# Patient Record
Sex: FEMALE | Race: WHITE | Employment: OTHER | URBAN - METROPOLITAN AREA
[De-identification: names, ages, dates, MRNs, and addresses within clinical notes are randomized per-mention and may not be internally consistent; named-entity substitution may affect disease eponyms.]

---

## 2017-05-22 ENCOUNTER — HOSPITAL ENCOUNTER (OUTPATIENT)
Dept: MAMMOGRAPHY | Age: 67
Discharge: HOME | End: 2017-05-22
Attending: FAMILY MEDICINE
Payer: MEDICARE

## 2017-05-22 DIAGNOSIS — Z12.31 ENCOUNTER FOR SCREENING MAMMOGRAM FOR BREAST CANCER: ICD-10-CM

## 2017-05-22 PROCEDURE — 77067 SCR MAMMO BI INCL CAD: CPT

## 2017-05-25 ENCOUNTER — HOSPITAL ENCOUNTER (OUTPATIENT)
Dept: MAMMOGRAPHY | Age: 67
Discharge: HOME | End: 2017-05-25
Attending: FAMILY MEDICINE
Payer: MEDICARE

## 2017-05-25 DIAGNOSIS — R92.8 ABNORMAL SCREENING MAMMOGRAM: ICD-10-CM

## 2017-05-25 PROCEDURE — 76642 ULTRASOUND BREAST LIMITED: CPT

## 2017-05-25 PROCEDURE — 77065 DX MAMMO INCL CAD UNI: CPT

## 2017-05-31 ENCOUNTER — HOSPITAL ENCOUNTER (OUTPATIENT)
Dept: MAMMOGRAPHY | Age: 67
Discharge: HOME | End: 2017-05-31
Attending: FAMILY MEDICINE
Payer: MEDICARE

## 2017-05-31 VITALS — HEART RATE: 101 BPM | DIASTOLIC BLOOD PRESSURE: 60 MMHG | OXYGEN SATURATION: 97 % | SYSTOLIC BLOOD PRESSURE: 127 MMHG

## 2017-05-31 DIAGNOSIS — R92.1 BREAST CALCIFICATION, LEFT: ICD-10-CM

## 2017-05-31 PROCEDURE — 74011250636 HC RX REV CODE- 250/636: Performed by: FAMILY MEDICINE

## 2017-05-31 PROCEDURE — 74011000250 HC RX REV CODE- 250: Performed by: FAMILY MEDICINE

## 2017-05-31 PROCEDURE — 19081 BX BREAST 1ST LESION STRTCTC: CPT

## 2017-05-31 PROCEDURE — 77065 DX MAMMO INCL CAD UNI: CPT

## 2017-05-31 PROCEDURE — 88305 TISSUE EXAM BY PATHOLOGIST: CPT | Performed by: FAMILY MEDICINE

## 2017-05-31 RX ORDER — LIDOCAINE HYDROCHLORIDE AND EPINEPHRINE 10; 10 MG/ML; UG/ML
10 INJECTION, SOLUTION INFILTRATION; PERINEURAL
Status: COMPLETED | OUTPATIENT
Start: 2017-05-31 | End: 2017-05-31

## 2017-05-31 RX ORDER — LIDOCAINE HYDROCHLORIDE 10 MG/ML
10 INJECTION INFILTRATION; PERINEURAL
Status: COMPLETED | OUTPATIENT
Start: 2017-05-31 | End: 2017-05-31

## 2017-05-31 RX ADMIN — LIDOCAINE HYDROCHLORIDE 1 ML: 10 INJECTION, SOLUTION INFILTRATION; PERINEURAL at 10:08

## 2017-05-31 RX ADMIN — LIDOCAINE HYDROCHLORIDE,EPINEPHRINE BITARTRATE 30 MG: 10; .01 INJECTION, SOLUTION INFILTRATION; PERINEURAL at 10:09

## 2017-05-31 RX ADMIN — SODIUM CHLORIDE 250 ML: 900 INJECTION, SOLUTION INTRAVENOUS at 10:09

## 2018-04-19 ENCOUNTER — APPOINTMENT (OUTPATIENT)
Dept: GENERAL RADIOLOGY | Age: 68
End: 2018-04-19
Attending: EMERGENCY MEDICINE
Payer: MEDICARE

## 2018-04-19 ENCOUNTER — HOSPITAL ENCOUNTER (EMERGENCY)
Age: 68
Discharge: HOME | End: 2018-04-19
Attending: EMERGENCY MEDICINE
Payer: MEDICARE

## 2018-04-19 VITALS
HEART RATE: 92 BPM | WEIGHT: 200 LBS | RESPIRATION RATE: 18 BRPM | TEMPERATURE: 98.4 F | OXYGEN SATURATION: 99 % | SYSTOLIC BLOOD PRESSURE: 128 MMHG | BODY MASS INDEX: 31.39 KG/M2 | DIASTOLIC BLOOD PRESSURE: 70 MMHG | HEIGHT: 67 IN

## 2018-04-19 DIAGNOSIS — M25.512 LEFT SHOULDER PAIN, UNSPECIFIED CHRONICITY: Primary | ICD-10-CM

## 2018-04-19 LAB
ALBUMIN SERPL-MCNC: 4 G/DL (ref 3.2–4.6)
ALBUMIN/GLOB SERPL: 1 {RATIO} (ref 1.2–3.5)
ALP SERPL-CCNC: 116 U/L (ref 50–136)
ALT SERPL-CCNC: 28 U/L (ref 12–65)
ANION GAP SERPL CALC-SCNC: 10 MMOL/L (ref 7–16)
AST SERPL-CCNC: 19 U/L (ref 15–37)
ATRIAL RATE: 100 BPM
BASOPHILS # BLD: 0.1 K/UL (ref 0–0.2)
BASOPHILS NFR BLD: 1 % (ref 0–2)
BILIRUB SERPL-MCNC: 0.2 MG/DL (ref 0.2–1.1)
BUN SERPL-MCNC: 13 MG/DL (ref 8–23)
CALCIUM SERPL-MCNC: 9.4 MG/DL (ref 8.3–10.4)
CALCULATED P AXIS, ECG09: 76 DEGREES
CALCULATED R AXIS, ECG10: 34 DEGREES
CALCULATED T AXIS, ECG11: 41 DEGREES
CHLORIDE SERPL-SCNC: 106 MMOL/L (ref 98–107)
CO2 SERPL-SCNC: 24 MMOL/L (ref 21–32)
CREAT SERPL-MCNC: 1.01 MG/DL (ref 0.6–1)
DIAGNOSIS, 93000: NORMAL
DIFFERENTIAL METHOD BLD: ABNORMAL
EOSINOPHIL # BLD: 0.2 K/UL (ref 0–0.8)
EOSINOPHIL NFR BLD: 2 % (ref 0.5–7.8)
ERYTHROCYTE [DISTWIDTH] IN BLOOD BY AUTOMATED COUNT: 15.1 % (ref 11.9–14.6)
GLOBULIN SER CALC-MCNC: 4.2 G/DL (ref 2.3–3.5)
GLUCOSE SERPL-MCNC: 91 MG/DL (ref 65–100)
HCT VFR BLD AUTO: 40.6 % (ref 35.8–46.3)
HGB BLD-MCNC: 13.3 G/DL (ref 11.7–15.4)
IMM GRANULOCYTES # BLD: 0 K/UL (ref 0–0.5)
IMM GRANULOCYTES NFR BLD AUTO: 0 % (ref 0–5)
LYMPHOCYTES # BLD: 4.6 K/UL (ref 0.5–4.6)
LYMPHOCYTES NFR BLD: 45 % (ref 13–44)
MCH RBC QN AUTO: 28.6 PG (ref 26.1–32.9)
MCHC RBC AUTO-ENTMCNC: 32.8 G/DL (ref 31.4–35)
MCV RBC AUTO: 87.3 FL (ref 79.6–97.8)
MONOCYTES # BLD: 0.6 K/UL (ref 0.1–1.3)
MONOCYTES NFR BLD: 6 % (ref 4–12)
NEUTS SEG # BLD: 4.7 K/UL (ref 1.7–8.2)
NEUTS SEG NFR BLD: 46 % (ref 43–78)
P-R INTERVAL, ECG05: 164 MS
PLATELET # BLD AUTO: 334 K/UL (ref 150–450)
PMV BLD AUTO: 10.8 FL (ref 10.8–14.1)
POTASSIUM SERPL-SCNC: 4 MMOL/L (ref 3.5–5.1)
PROT SERPL-MCNC: 8.2 G/DL (ref 6.3–8.2)
Q-T INTERVAL, ECG07: 344 MS
QRS DURATION, ECG06: 68 MS
QTC CALCULATION (BEZET), ECG08: 443 MS
RBC # BLD AUTO: 4.65 M/UL (ref 4.05–5.25)
SODIUM SERPL-SCNC: 140 MMOL/L (ref 136–145)
TROPONIN I SERPL-MCNC: <0.02 NG/ML (ref 0.02–0.05)
TROPONIN I SERPL-MCNC: <0.02 NG/ML (ref 0.02–0.05)
VENTRICULAR RATE, ECG03: 100 BPM
WBC # BLD AUTO: 10.1 K/UL (ref 4.3–11.1)

## 2018-04-19 PROCEDURE — 85025 COMPLETE CBC W/AUTO DIFF WBC: CPT | Performed by: EMERGENCY MEDICINE

## 2018-04-19 PROCEDURE — 73030 X-RAY EXAM OF SHOULDER: CPT

## 2018-04-19 PROCEDURE — 99283 EMERGENCY DEPT VISIT LOW MDM: CPT | Performed by: EMERGENCY MEDICINE

## 2018-04-19 PROCEDURE — 80053 COMPREHEN METABOLIC PANEL: CPT | Performed by: EMERGENCY MEDICINE

## 2018-04-19 PROCEDURE — 84484 ASSAY OF TROPONIN QUANT: CPT | Performed by: EMERGENCY MEDICINE

## 2018-04-19 PROCEDURE — 93005 ELECTROCARDIOGRAM TRACING: CPT | Performed by: EMERGENCY MEDICINE

## 2018-04-19 NOTE — ED PROVIDER NOTES
HPI Comments: Patient is a 62-year-old female history of MI status post 13 stents, anxiety and fibromyalgia presenting with 1.5 months of left shoulder pain. She states she was recently started on Lyrica by her PCP for her fibromyalgia but weaned herself off this medication because it causes her to become depressed. She is denying any shortness of breath, chest pain, leg swelling, abdominal pain or fevers. States pain is primarily in the left trapezius muscle region and worse with movement. No erythema or warmth noted by palpation. Patient is a 74 y.o. female presenting with shoulder pain. The history is provided by the patient. No  was used. Shoulder Pain           Past Medical History:   Diagnosis Date    Angina pectoris ([address] stable, exertional, post infarction     Backache, unspecified 7/9/2013    Chest pain     Coronary atherosclerosis of native coronary artery 8/3/2015    Degeneration of intervertebral disc, site unspecified 7/9/2013    Essential hypertension, benign 8/3/2015    Family history of other musculoskeletal diseases(V17.89) 7/9/2013    History of MI (myocardial infarction)     Mixed hyperlipidemia 8/3/2015    Other forms of migraine, without mention of intractable migraine without mention of status migrainosus 7/9/2013    Palpitations     Spasm of muscle 8/3/2015    Syncope and collapse     Unspecified hypothyroidism 8/3/2015       Past Surgical History:   Procedure Laterality Date    CARDIAC SURG PROCEDURE UNLIST  2004    MI 3 stents    HX TONSIL AND ADENOIDECTOMY      HX TOTAL VAGINAL HYSTERECTOMY           Family History:   Problem Relation Age of Onset    Lung Disease Mother     Heart Disease Mother     Psychiatric Disorder Son      schizoaffective       Social History     Social History    Marital status:      Spouse name: N/A    Number of children: N/A    Years of education: N/A     Occupational History    Not on file.      Social History Main Topics    Smoking status: Never Smoker    Smokeless tobacco: Never Used    Alcohol use No      Comment: occ    Drug use: No    Sexual activity: No     Other Topics Concern     Service No    Blood Transfusions No    Caffeine Concern No    Occupational Exposure No    Hobby Hazards No    Sleep Concern No    Stress Concern No    Weight Concern No    Special Diet No    Back Care Yes    Exercise No    Bike Helmet No    Seat Belt Yes    Self-Exams No     Social History Narrative    Lives with daughter, son-in-law and grandchildren. ALLERGIES: Crestor [rosuvastatin]; Imdur [isosorbide mononitrate]; Iodinated contrast- oral and iv dye; Lipitor [atorvastatin]; Monosodium glutamate; Niaspan [niacin]; and Statins-hmg-coa reductase inhibitors    Review of Systems   Constitutional: Negative for fatigue and fever. HENT: Negative for sore throat. Respiratory: Negative for cough, chest tightness and shortness of breath. Cardiovascular: Negative for leg swelling. Gastrointestinal: Negative for abdominal pain. Genitourinary: Negative for dysuria. Musculoskeletal: Negative for back pain. Left shoulder pain   Neurological: Negative for syncope and headaches. Psychiatric/Behavioral: Negative for confusion. Vitals:    04/19/18 1655   BP: 126/74   Pulse: 96   Resp: 18   Temp: 98.2 °F (36.8 °C)   SpO2: 99%   Weight: 90.7 kg (200 lb)   Height: 5' 7\" (1.702 m)            Physical Exam   Constitutional: She is oriented to person, place, and time. She appears well-developed and well-nourished. No distress. HENT:   Head: Normocephalic and atraumatic. Eyes: Conjunctivae and EOM are normal. Pupils are equal, round, and reactive to light. Neck: Normal range of motion. Neck supple. Cardiovascular: Normal rate, regular rhythm and normal heart sounds. Pulmonary/Chest: Effort normal and breath sounds normal. No respiratory distress. She has no wheezes.  She has no rales.   Abdominal: Soft. She exhibits no distension. There is no tenderness. There is no rebound. Musculoskeletal: Normal range of motion. She exhibits tenderness. She exhibits no edema. Pain to palpation in the left before meals joint region as well as left trapezius region. Somewhat decreased range of motion secondary to pain. Normal pulses distally. Neurological: She is alert and oriented to person, place, and time. Skin: Skin is warm and dry. No rash noted. She is not diaphoretic. Psychiatric: She has a normal mood and affect. Her behavior is normal.   Vitals reviewed. MDM  Number of Diagnoses or Management Options  Left shoulder pain, unspecified chronicity: new and does not require workup  Diagnosis management comments: EKG reassuring. Troponin ×2 negative. I believe this is musculoskeletal shoulder pain likely related to the before meals pathology indicated on x-ray. Patient has pain medications at home. Discussed follow up with PCP. Discharged in stable condition. Return precautions discussed. Melissa Bryant MD; 4/19/2018 @8:26 PM Voice dictation software was used during the making of this note. This software is not perfect and grammatical and other typographical errors may be present.   This note has not been proofread for errors.  ===================================================================         Amount and/or Complexity of Data Reviewed  Clinical lab tests: reviewed and ordered (Results for orders placed or performed during the hospital encounter of 04/19/18  -CBC WITH AUTOMATED DIFF       Result                                            Value                         Ref Range                       WBC                                               10.1                          4.3 - 11.1 K/uL                 RBC                                               4.65                          4.05 - 5.25 M/uL                HGB 13.3                          11.7 - 15.4 g/dL                HCT                                               40.6                          35.8 - 46.3 %                   MCV                                               87.3                          79.6 - 97.8 FL                  MCH                                               28.6                          26.1 - 32.9 PG                  MCHC                                              32.8                          31.4 - 35.0 g/dL                RDW                                               15.1 (H)                      11.9 - 14.6 %                   PLATELET                                          334                           150 - 450 K/uL                  MPV                                               10.8                          10.8 - 14.1 FL                  DF                                                AUTOMATED                                                     NEUTROPHILS                                       46                            43 - 78 %                       LYMPHOCYTES                                       45 (H)                        13 - 44 %                       MONOCYTES                                         6                             4.0 - 12.0 %                    EOSINOPHILS                                       2                             0.5 - 7.8 %                     BASOPHILS                                         1                             0.0 - 2.0 %                     IMMATURE GRANULOCYTES                             0                             0.0 - 5.0 %                     ABS. NEUTROPHILS                                  4.7                           1.7 - 8.2 K/UL                  ABS.  LYMPHOCYTES                                  4.6                           0.5 - 4.6 K/UL                  ABS. MONOCYTES                                    0.6                           0.1 - 1.3 K/UL                  ABS. EOSINOPHILS                                  0.2                           0.0 - 0.8 K/UL                  ABS. BASOPHILS                                    0.1                           0.0 - 0.2 K/UL                  ABS. IMM.  GRANS.                                  0.0                           0.0 - 0.5 K/UL             -METABOLIC PANEL, COMPREHENSIVE       Result                                            Value                         Ref Range                       Sodium                                            140                           136 - 145 mmol/L                Potassium                                         4.0                           3.5 - 5.1 mmol/L                Chloride                                          106                           98 - 107 mmol/L                 CO2                                               24                            21 - 32 mmol/L                  Anion gap                                         10                            7 - 16 mmol/L                   Glucose                                           91                            65 - 100 mg/dL                  BUN                                               13                            8 - 23 MG/DL                    Creatinine                                        1.01 (H)                      0.6 - 1.0 MG/DL                 GFR est AA                                        >60                           >60 ml/min/1.73m2               GFR est non-AA                                    58 (L)                        >60 ml/min/1.73m2               Calcium                                           9.4                           8.3 - 10.4 MG/DL                Bilirubin, total                                  0.2                           0.2 - 1.1 MG/DL                 ALT (SGPT)                                        28                            12 - 65 U/L AST (SGOT)                                        19                            15 - 37 U/L                     Alk. phosphatase                                  116                           50 - 136 U/L                    Protein, total                                    8.2                           6.3 - 8.2 g/dL                  Albumin                                           4.0                           3.2 - 4.6 g/dL                  Globulin                                          4.2 (H)                       2.3 - 3.5 g/dL                  A-G Ratio                                         1.0 (L)                       1.2 - 3.5                  -TROPONIN I       Result                                            Value                         Ref Range                       Troponin-I, Qt.                                   <0.02 (L)                     0.02 - 0.05 NG/ML          -TROPONIN I       Result                                            Value                         Ref Range                       Troponin-I, Qt.                                   <0.02 (L)                     0.02 - 0.05 NG/ML          -EKG, 12 LEAD, INITIAL       Result                                            Value                         Ref Range                       Ventricular Rate                                  100                           BPM                             Atrial Rate                                       100                           BPM                             P-R Interval                                      164                           ms                              QRS Duration                                      68                            ms                              Q-T Interval                                      344                           ms                              QTC Calculation (Bezet)                           443                           ms Calculated P Axis                                 76                            degrees                         Calculated R Axis                                 34                            degrees                         Calculated T Axis                                 41                            degrees                         Diagnosis                                                                                                   Sinus rhythm with occasional Premature ventricular complexes   Low voltage QRS   Cannot rule out Anterior infarct , age undetermined   Abnormal ECG   No previous ECGs available     )  Tests in the radiology section of CPT®: ordered and reviewed (Xr Shoulder Lt Ap/lat Min 2 V    Result Date: 4/19/2018  LEFT SHOULDER SERIES HISTORY: Left shoulder pain FINDINGS: Acromioclavicular joint arthrosis is present. There is no displaced fracture or dislocation. Included portion of the left lung is clear.      IMPRESSION: AC joint arthrosis.    )  Review and summarize past medical records: yes  Independent visualization of images, tracings, or specimens: yes    Risk of Complications, Morbidity, and/or Mortality  Presenting problems: moderate  Diagnostic procedures: moderate  Management options: moderate    Patient Progress  Patient progress: stable        ED Course       Procedures

## 2018-04-20 NOTE — DISCHARGE INSTRUCTIONS
Musculoskeletal Pain: Care Instructions  Your Care Instructions  Different problems with the bones, muscles, nerves, ligaments, and tendons in the body can cause pain. One or more areas of your body may ache or burn. Or they may feel tired, stiff, or sore. The medical term for this type of pain is musculoskeletal pain. It can have many different causes. Sometimes the pain is caused by an injury such as a strain or sprain. Or you might have pain from using one part of your body in the same way over and over again. This is called overuse. In some cases, the cause of the pain is another health problem such as arthritis or fibromyalgia. The doctor will examine you and ask you questions about your health to help find the cause of your pain. Blood tests or imaging tests like an X-ray may also be helpful. But sometimes doctors can't find a cause of the pain. Treatment depends on your symptoms and the cause of the pain, if known. The doctor has checked you carefully, but problems can develop later. If you notice any problems or new symptoms, get medical treatment right away. Follow-up care is a key part of your treatment and safety. Be sure to make and go to all appointments, and call your doctor if you are having problems. It's also a good idea to know your test results and keep a list of the medicines you take. How can you care for yourself at home? · Rest until you feel better. · Do not do anything that makes the pain worse. Return to exercise gradually if you feel better and your doctor says it's okay. · Be safe with medicines. Read and follow all instructions on the label. ¨ If the doctor gave you a prescription medicine for pain, take it as prescribed. ¨ If you are not taking a prescription pain medicine, ask your doctor if you can take an over-the-counter medicine. · Put ice or a cold pack on the area for 10 to 20 minutes at a time to ease pain. Put a thin cloth between the ice and your skin.   When should you call for help? Call your doctor now or seek immediate medical care if:  · You have new pain, or your pain gets worse. · You have new symptoms such as a fever, a rash, or chills. Watch closely for changes in your health, and be sure to contact your doctor if:  · You do not get better as expected. Where can you learn more? Go to [employer]be  Enter Q624 in the search box to learn more about \"Musculoskeletal Pain: Care Instructions. \"   © 8728-1483 [employer]. Care instructions adapted under license by Leighann Bell (which disclaims liability or warranty for this information). This care instruction is for use with your licensed healthcare professional. If you have questions about a medical condition or this instruction, always ask your healthcare professional. [address] any warranty or liability for your use of this information.   Content Version: 71.6.175158; Current as of: November 20, 2015

## 2018-04-20 NOTE — ED NOTES
I have reviewed discharge instructions with the patient. The patient verbalized understanding. Patient left ED via Discharge Method: ambulatory to Home with daughter. Opportunity for questions and clarification provided. Patient given 0 scripts. To continue your aftercare when you leave the hospital, you may receive an automated call from our care team to check in on how you are doing. This is a free service and part of our promise to provide the best care and service to meet your aftercare needs.  If you have questions, or wish to unsubscribe from this service please call [phone]. Thank you for Choosing our [facility] Emergency Department.

## 2018-05-17 ENCOUNTER — HOSPITAL ENCOUNTER (OUTPATIENT)
Dept: MAMMOGRAPHY | Age: 68
Discharge: HOME | End: 2018-05-17
Attending: FAMILY MEDICINE
Payer: MEDICARE

## 2018-05-17 DIAGNOSIS — Z13.820 SCREENING FOR OSTEOPOROSIS: ICD-10-CM

## 2018-05-17 PROCEDURE — 77080 DXA BONE DENSITY AXIAL: CPT

## 2019-01-06 ENCOUNTER — APPOINTMENT (OUTPATIENT)
Dept: GENERAL RADIOLOGY | Age: 69
End: 2019-01-06
Attending: EMERGENCY MEDICINE
Payer: MEDICARE

## 2019-01-06 ENCOUNTER — HOSPITAL ENCOUNTER (EMERGENCY)
Age: 69
Discharge: HOME | End: 2019-01-06
Attending: EMERGENCY MEDICINE
Payer: MEDICARE

## 2019-01-06 VITALS
RESPIRATION RATE: 20 BRPM | HEART RATE: 77 BPM | OXYGEN SATURATION: 96 % | TEMPERATURE: 98.7 F | HEIGHT: 67 IN | BODY MASS INDEX: 31.39 KG/M2 | DIASTOLIC BLOOD PRESSURE: 70 MMHG | SYSTOLIC BLOOD PRESSURE: 127 MMHG | WEIGHT: 200 LBS

## 2019-01-06 DIAGNOSIS — R07.9 CHEST PAIN OF UNCERTAIN ETIOLOGY: Primary | ICD-10-CM

## 2019-01-06 LAB
ALBUMIN SERPL-MCNC: 3.7 G/DL (ref 3.2–4.6)
ALBUMIN/GLOB SERPL: 0.9 {RATIO}
ALP SERPL-CCNC: 128 U/L (ref 50–136)
ALT SERPL-CCNC: 19 U/L (ref 12–65)
ANION GAP SERPL CALC-SCNC: 7 MMOL/L
AST SERPL-CCNC: 11 U/L (ref 15–37)
ATRIAL RATE: 96 BPM
BASOPHILS # BLD: 0.1 K/UL (ref 0–0.2)
BASOPHILS NFR BLD: 1 % (ref 0–2)
BILIRUB SERPL-MCNC: 0.2 MG/DL (ref 0.2–1.1)
BUN SERPL-MCNC: 16 MG/DL (ref 8–23)
CALCIUM SERPL-MCNC: 9.3 MG/DL (ref 8.3–10.4)
CALCULATED P AXIS, ECG09: 75 DEGREES
CALCULATED R AXIS, ECG10: 32 DEGREES
CALCULATED T AXIS, ECG11: 33 DEGREES
CHLORIDE SERPL-SCNC: 103 MMOL/L (ref 98–107)
CO2 SERPL-SCNC: 27 MMOL/L (ref 21–32)
CREAT SERPL-MCNC: 1.05 MG/DL (ref 0.6–1)
DIAGNOSIS, 93000: NORMAL
DIFFERENTIAL METHOD BLD: ABNORMAL
EOSINOPHIL # BLD: 0.2 K/UL (ref 0–0.8)
EOSINOPHIL NFR BLD: 2 % (ref 0.5–7.8)
ERYTHROCYTE [DISTWIDTH] IN BLOOD BY AUTOMATED COUNT: 14.3 % (ref 11.9–14.6)
GLOBULIN SER CALC-MCNC: 4.2 G/DL (ref 2.3–3.5)
GLUCOSE SERPL-MCNC: 109 MG/DL (ref 65–100)
HCT VFR BLD AUTO: 42.2 % (ref 35.8–46.3)
HGB BLD-MCNC: 13.1 G/DL (ref 11.7–15.4)
IMM GRANULOCYTES # BLD: 0 K/UL (ref 0–0.5)
IMM GRANULOCYTES NFR BLD AUTO: 0 % (ref 0–5)
LIPASE SERPL-CCNC: 172 U/L (ref 73–393)
LYMPHOCYTES # BLD: 2.9 K/UL (ref 0.5–4.6)
LYMPHOCYTES NFR BLD: 36 % (ref 13–44)
MCH RBC QN AUTO: 28.3 PG (ref 26.1–32.9)
MCHC RBC AUTO-ENTMCNC: 31 G/DL (ref 31.4–35)
MCV RBC AUTO: 91.1 FL (ref 79.6–97.8)
MONOCYTES # BLD: 0.5 K/UL (ref 0.1–1.3)
MONOCYTES NFR BLD: 7 % (ref 4–12)
NEUTS SEG # BLD: 4.4 K/UL (ref 1.7–8.2)
NEUTS SEG NFR BLD: 54 % (ref 43–78)
NRBC # BLD: 0 K/UL (ref 0–0.2)
P-R INTERVAL, ECG05: 176 MS
PLATELET # BLD AUTO: 330 K/UL (ref 150–450)
PMV BLD AUTO: 10.5 FL (ref 9.4–12.3)
POTASSIUM SERPL-SCNC: 4.1 MMOL/L (ref 3.5–5.1)
PROT SERPL-MCNC: 7.9 G/DL
Q-T INTERVAL, ECG07: 338 MS
QRS DURATION, ECG06: 72 MS
QTC CALCULATION (BEZET), ECG08: 427 MS
RBC # BLD AUTO: 4.63 M/UL (ref 4.05–5.2)
SODIUM SERPL-SCNC: 137 MMOL/L (ref 136–145)
TROPONIN I BLD-MCNC: 0 NG/ML (ref 0.02–0.05)
TROPONIN I BLD-MCNC: 0.01 NG/ML (ref 0.02–0.05)
VENTRICULAR RATE, ECG03: 96 BPM
WBC # BLD AUTO: 8.1 K/UL (ref 4.3–11.1)

## 2019-01-06 PROCEDURE — 93005 ELECTROCARDIOGRAM TRACING: CPT | Performed by: EMERGENCY MEDICINE

## 2019-01-06 PROCEDURE — 74011250637 HC RX REV CODE- 250/637: Performed by: EMERGENCY MEDICINE

## 2019-01-06 PROCEDURE — 99285 EMERGENCY DEPT VISIT HI MDM: CPT | Performed by: EMERGENCY MEDICINE

## 2019-01-06 PROCEDURE — 80053 COMPREHEN METABOLIC PANEL: CPT

## 2019-01-06 PROCEDURE — 84484 ASSAY OF TROPONIN QUANT: CPT

## 2019-01-06 PROCEDURE — 85025 COMPLETE CBC W/AUTO DIFF WBC: CPT

## 2019-01-06 PROCEDURE — 74011000250 HC RX REV CODE- 250: Performed by: EMERGENCY MEDICINE

## 2019-01-06 PROCEDURE — 96374 THER/PROPH/DIAG INJ IV PUSH: CPT | Performed by: EMERGENCY MEDICINE

## 2019-01-06 PROCEDURE — 71046 X-RAY EXAM CHEST 2 VIEWS: CPT

## 2019-01-06 PROCEDURE — 74011250636 HC RX REV CODE- 250/636: Performed by: EMERGENCY MEDICINE

## 2019-01-06 PROCEDURE — 83690 ASSAY OF LIPASE: CPT

## 2019-01-06 RX ORDER — GUAIFENESIN 100 MG/5ML
324 LIQUID (ML) ORAL
Status: COMPLETED | OUTPATIENT
Start: 2019-01-06 | End: 2019-01-06

## 2019-01-06 RX ORDER — MAG HYDROX/ALUMINUM HYD/SIMETH 200-200-20
30 SUSPENSION, ORAL (FINAL DOSE FORM) ORAL
Status: COMPLETED | OUTPATIENT
Start: 2019-01-06 | End: 2019-01-06

## 2019-01-06 RX ORDER — ONDANSETRON 2 MG/ML
4 INJECTION INTRAMUSCULAR; INTRAVENOUS
Status: COMPLETED | OUTPATIENT
Start: 2019-01-06 | End: 2019-01-06

## 2019-01-06 RX ORDER — LIDOCAINE HYDROCHLORIDE 20 MG/ML
15 SOLUTION OROPHARYNGEAL
Status: COMPLETED | OUTPATIENT
Start: 2019-01-06 | End: 2019-01-06

## 2019-01-06 RX ADMIN — LIDOCAINE HYDROCHLORIDE 15 ML: 20 SOLUTION ORAL; TOPICAL at 14:18

## 2019-01-06 RX ADMIN — ONDANSETRON 4 MG: 2 INJECTION INTRAMUSCULAR; INTRAVENOUS at 14:35

## 2019-01-06 RX ADMIN — ASPIRIN 81 MG 324 MG: 81 TABLET ORAL at 13:57

## 2019-01-06 RX ADMIN — ALUMINUM HYDROXIDE, MAGNESIUM HYDROXIDE, AND SIMETHICONE 30 ML: 200; 200; 20 SUSPENSION ORAL at 14:18

## 2019-01-06 NOTE — ED TRIAGE NOTES
Patient arrives from home with complaint of mid-right, \"dull, heavy\" chest pain that started 12:15 this afternoon. Patient was resting in bed when this started. The pain is not reproducible and non-radiating. Patient took four tums without relief. Patient then took three doses of sublingual Nitro tabs, which dulled the pain but did not eliminate it. Denies nausea, vomiting, sweating, dizziness. Patient has had a heart attack in 2004 with 13 stents placed since then. The last stent placed in 2010

## 2019-01-06 NOTE — ED NOTES
I have reviewed discharge instructions with the patient. The patient verbalized understanding. Patient left ED via Discharge Method: ambulatory to Home with her daughter at bedside. Opportunity for questions and clarification provided. Patient given 0 scripts. To continue your aftercare when you leave the hospital, you may receive an automated call from our care team to check in on how you are doing. This is a free service and part of our promise to provide the best care and service to meet your aftercare needs.  If you have questions, or wish to unsubscribe from this service please call [phone]. Thank you for Choosing our [facility] Emergency Department.

## 2019-01-06 NOTE — ED PROVIDER NOTES
HPI  
 
19-year-old female presenting to the emergency department for evaluation of chest pain. Her symptoms began at 12:15. It's located sternally and slightly to the right. She states it feels like something is stuck there. It did occur approximately 30-40 minutes after she took her morning medications. However she does not feel like something is hung up in her chest.  Her that she is having difficulty swallowing. She does have a known history of coronary artery disease. She reports a history of myocardial infarction and states she has 13 stents. She states her last stent was in 2010 when she had her last catheterization. Her last stress testing was done at least 2-3 years ago. She states that the pain does not radiate. It is not associated with shortness of breath or diaphoresis. There are no alleviating or exacerbating symptoms. She took Tums after the pain started which did not help. She then took nitroglycerin which did not help either though she does state it slightly dulled the pain. She has no history of similar symptoms to this in the past. 
 
Past Medical History:  
Diagnosis Date  Angina pectoris ([address] stable, exertional, post infarction  Backache, unspecified 7/9/2013  Chest pain  Coronary atherosclerosis of native coronary artery 8/3/2015  Degeneration of intervertebral disc, site unspecified 7/9/2013  Essential hypertension, benign 8/3/2015  Family history of other musculoskeletal diseases(V17.89) 7/9/2013  History of MI (myocardial infarction)  Mixed hyperlipidemia 8/3/2015  Other forms of migraine, without mention of intractable migraine without mention of status migrainosus 7/9/2013  Palpitations  Spasm of muscle 8/3/2015  Syncope and collapse  Unspecified hypothyroidism 8/3/2015 Past Surgical History:  
Procedure Laterality Date  CARDIAC SURG PROCEDURE UNLIST  2004 MI 3 stents  HX TONSIL AND ADENOIDECTOMY  HX TOTAL VAGINAL HYSTERECTOMY Family History:  
Problem Relation Age of Onset  Lung Disease Mother  Heart Disease Mother  Psychiatric Disorder Son   
     schizoaffective Social History Socioeconomic History  Marital status:  Spouse name: Not on file  Number of children: Not on file  Years of education: Not on file  Highest education level: Not on file Social Needs  Financial resource strain: Not on file  Food insecurity - worry: Not on file  Food insecurity - inability: Not on file  Transportation needs - medical: Not on file  Transportation needs - non-medical: Not on file Occupational History  Not on file Tobacco Use  Smoking status: Never Smoker  Smokeless tobacco: Never Used Substance and Sexual Activity  Alcohol use: No  
  Alcohol/week: 0.0 oz  
  Comment: occ  Drug use: No  
 Sexual activity: No  
Other Topics Concern   Service No  
 Blood Transfusions No  
 Caffeine Concern No  
 Occupational Exposure No  
 Hobby Hazards No  
 Sleep Concern No  
 Stress Concern No  
 Weight Concern No  
 Special Diet No  
 Back Care Yes  Exercise No  
 Bike Helmet No  
 Seat Belt Yes  Self-Exams No  
Social History Narrative Lives with daughter, son-in-law and grandchildren. ALLERGIES: Crestor [rosuvastatin]; Imdur [isosorbide mononitrate]; Iodinated contrast- oral and iv dye; Lipitor [atorvastatin]; Monosodium glutamate; Niaspan [niacin]; and Statins-hmg-coa reductase inhibitors Review of Systems Constitutional: Negative for fever. HENT: Negative. Eyes: Negative. Respiratory: Negative for cough, chest tightness, shortness of breath and wheezing. Cardiovascular: Positive for chest pain. Gastrointestinal: Negative for abdominal distention, abdominal pain, constipation, diarrhea and vomiting. Endocrine: Negative. Genitourinary: Negative for dysuria, flank pain, frequency and urgency. Neurological: Negative for dizziness, syncope and headaches. Psychiatric/Behavioral: Negative. All other systems reviewed and are negative. Vitals:  
 01/06/19 1335 01/06/19 1343 BP: 128/63 Pulse: 87 Resp: 18 Temp: 98.2 °F (36.8 °C) SpO2: 97% 97% Weight: 90.7 kg (200 lb) Height: 5' 7\" (1.702 m) Physical Exam  
Constitutional: She is oriented to person, place, and time. She appears well-developed and well-nourished. No distress. HENT:  
Head: Normocephalic and atraumatic. Eyes: EOM are normal. Pupils are equal, round, and reactive to light. Neck: Normal range of motion. Neck supple. Cardiovascular: Normal rate, regular rhythm, normal heart sounds and intact distal pulses. Exam reveals no gallop and no friction rub. No murmur heard. Pulmonary/Chest: Effort normal and breath sounds normal. No stridor. No respiratory distress. She has no wheezes. She exhibits no tenderness. Abdominal: Soft. Bowel sounds are normal. She exhibits no distension and no mass. There is no tenderness. There is no rebound and no guarding. Musculoskeletal: Normal range of motion. She exhibits no edema, tenderness or deformity. Neurological: She is alert and oriented to person, place, and time. Skin: Skin is warm and dry. Capillary refill takes less than 2 seconds. No rash noted. She is not diaphoretic. No erythema. Psychiatric: She has a normal mood and affect. Her behavior is normal.  
Vitals reviewed. MDM Number of Diagnoses or Management Options Chest pain of uncertain etiology:  
Diagnosis management comments: Differential diagnosis: ACS, PE, pneumonia, esophageal tear, esophageal perform body, dissection, reflux, peptic ulcer disease, gastritis 5:14 PM 
Patient presents to the ED today complaining of chest pain. Has a nonischemic EKG, negative troponin, neg three hour trop, unremarkable CXR. Given these findings, I think this is very unlikely to represent ACS or other concerning cause of chest pain. I did discuss the case with Dr. Niki Santos of [facility] cardiology who agrees that if the patient has 2 negative troponins she would be appropriate for outpatient follow-up. Her chest pain does sound extremely atypical and more likely to be gastrointestinal related to some sort of esophageal issue. However given her history, I will arrange for very close outpatient follow-up with cardiology. She understands that the exact cause of her symptoms is unclear at this time and because of this she should've a low threshold to return should her symptoms worsen in any way. Amount and/or Complexity of Data Reviewed Clinical lab tests: ordered and reviewed Tests in the radiology section of CPT®: ordered and reviewed Risk of Complications, Morbidity, and/or Mortality Presenting problems: high Diagnostic procedures: high Management options: high EKG Date/Time: 1/6/2019 1:49 PM 
Performed by: Hans Dunham MD 
Authorized by: Hans Dunham MD  
 
Interpretation: Interpretation: non-specific Rate:  
  ECG rate assessment: normal   
Rhythm:  
  Rhythm: sinus rhythm QRS:  
  QRS axis:  Normal 
ST segments: ST segments:  Non-specific T waves:  
  T waves: normal   
Comments:  
   No acute ischemic changes

## 2019-01-06 NOTE — DISCHARGE INSTRUCTIONS
As we discussed in the emergency department, the exact cause of her symptoms is unclear. Because of this she should have a low threshold to return should her symptoms change or worsen in any way. Otherwise, it is very important that he follow-up in 1-2 days with your cardiologist for further evaluation to ensure you are improving.

## 2019-01-23 ENCOUNTER — HOSPITAL ENCOUNTER (OUTPATIENT)
Dept: LAB | Age: 69
Discharge: HOME | End: 2019-01-23
Payer: MEDICARE

## 2019-01-23 DIAGNOSIS — E78.2 MIXED HYPERLIPIDEMIA: ICD-10-CM

## 2019-01-23 LAB
CHOLEST SERPL-MCNC: 185 MG/DL
HDLC SERPL-MCNC: 51 MG/DL (ref 40–60)
HDLC SERPL: 3.6 {RATIO}
LDLC SERPL CALC-MCNC: 89 MG/DL
LIPID PROFILE,FLP: ABNORMAL
TRIGL SERPL-MCNC: 225 MG/DL (ref 35–150)
VLDLC SERPL CALC-MCNC: 45 MG/DL (ref 6–23)

## 2019-01-23 PROCEDURE — 36415 COLL VENOUS BLD VENIPUNCTURE: CPT

## 2019-01-23 PROCEDURE — 80061 LIPID PANEL: CPT

## 2019-01-31 PROBLEM — E78.00 HYPERCHOLESTEREMIA: Status: ACTIVE | Noted: 2019-01-31

## 2020-09-04 PROBLEM — I25.9 CHRONIC ISCHEMIC HEART DISEASE: Status: ACTIVE | Noted: 2020-09-04

## 2020-09-11 ENCOUNTER — HOSPITAL ENCOUNTER (OUTPATIENT)
Dept: LAB | Age: 70
Discharge: HOME | End: 2020-09-11
Payer: MEDICARE

## 2020-09-11 DIAGNOSIS — E78.5 HYPERLIPIDEMIA, UNSPECIFIED HYPERLIPIDEMIA TYPE: ICD-10-CM

## 2020-09-11 LAB
CHOLEST SERPL-MCNC: 251 MG/DL
HDLC SERPL-MCNC: 40 MG/DL (ref 40–60)
HDLC SERPL: 6.3 {RATIO}
LDLC SERPL CALC-MCNC: 157.2 MG/DL
LIPID PROFILE,FLP: ABNORMAL
TRIGL SERPL-MCNC: 269 MG/DL (ref 35–150)
VLDLC SERPL CALC-MCNC: 53.8 MG/DL (ref 6–23)

## 2020-09-11 PROCEDURE — 36415 COLL VENOUS BLD VENIPUNCTURE: CPT

## 2020-09-11 PROCEDURE — 80061 LIPID PANEL: CPT

## 2021-01-06 PROBLEM — R47.01 EXPRESSIVE APHASIA: Status: ACTIVE | Noted: 2021-01-06

## 2021-01-06 PROBLEM — I10 HYPERTENSION: Status: ACTIVE | Noted: 2021-01-06

## 2022-03-19 PROBLEM — I25.9 CHRONIC ISCHEMIC HEART DISEASE: Status: ACTIVE | Noted: 2020-09-04

## 2022-03-19 PROBLEM — I10 HYPERTENSION: Status: ACTIVE | Noted: 2021-01-06

## 2022-03-20 PROBLEM — R47.01 EXPRESSIVE APHASIA: Status: ACTIVE | Noted: 2021-01-06

## 2022-03-20 PROBLEM — E78.00 HYPERCHOLESTEREMIA: Status: ACTIVE | Noted: 2019-01-31

## 2022-10-28 ENCOUNTER — TELEPHONE (OUTPATIENT)
Dept: CARDIOLOGY CLINIC | Age: 72
End: 2022-10-28

## 2022-10-28 RX ORDER — CLOPIDOGREL BISULFATE 75 MG/1
75 TABLET ORAL DAILY
Qty: 30 TABLET | Refills: 0 | Status: ON HOLD | OUTPATIENT
Start: 2022-10-28 | End: 2022-11-01 | Stop reason: SDUPTHER

## 2022-10-28 NOTE — TELEPHONE ENCOUNTER
MD Quiana Raza, RN  Caller: Unspecified (Today, 12:42 PM)  Will refill for now. Please refill. Refill escribed to preferred pharmacy.

## 2022-10-28 NOTE — TELEPHONE ENCOUNTER
MEDICATION REFILL REQUEST      Name of Medication:  Clopidogrel  Dose:  75 mg  Frequency:  1 a day  Quantity:  ?  Days' supply:  ?       Pharmacy Name/Location:  [facility] in [id number]    Pt is out please call in today

## 2022-10-30 ENCOUNTER — HOSPITAL ENCOUNTER (OUTPATIENT)
Age: 72
Setting detail: OBSERVATION
Discharge: HOME | End: 2022-11-01
Attending: INTERNAL MEDICINE | Admitting: INTERNAL MEDICINE
Payer: MEDICARE

## 2022-10-30 DIAGNOSIS — I25.10 CAD IN NATIVE ARTERY: Chronic | ICD-10-CM

## 2022-10-30 DIAGNOSIS — I25.9 CHRONIC ISCHEMIC HEART DISEASE: Primary | ICD-10-CM

## 2022-10-30 DIAGNOSIS — R07.9 CHEST PAIN: ICD-10-CM

## 2022-10-30 DIAGNOSIS — I20.8 OTHER FORMS OF ANGINA PECTORIS (HCC): ICD-10-CM

## 2022-10-30 DIAGNOSIS — R00.1 BRADYCARDIA: ICD-10-CM

## 2022-10-30 PROBLEM — M54.50 CHRONIC LOW BACK PAIN: Chronic | Status: ACTIVE | Noted: 2022-10-30

## 2022-10-30 PROBLEM — N30.01 ACUTE CYSTITIS WITH HEMATURIA: Status: ACTIVE | Noted: 2022-10-30

## 2022-10-30 PROBLEM — F41.9 ANXIETY: Chronic | Status: ACTIVE | Noted: 2022-10-30

## 2022-10-30 PROBLEM — G89.29 CHRONIC LOW BACK PAIN: Chronic | Status: ACTIVE | Noted: 2022-10-30

## 2022-10-30 LAB
EKG ATRIAL RATE: 77 BPM
EKG DIAGNOSIS: NORMAL
EKG P AXIS: 53 DEGREES
EKG P-R INTERVAL: 186 MS
EKG Q-T INTERVAL: 386 MS
EKG QRS DURATION: 82 MS
EKG QTC CALCULATION (BAZETT): 436 MS
EKG R AXIS: 15 DEGREES
EKG T AXIS: 71 DEGREES
EKG VENTRICULAR RATE: 77 BPM
TROPONIN I SERPL HS-MCNC: 4.9 PG/ML (ref 0–14)

## 2022-10-30 PROCEDURE — 93005 ELECTROCARDIOGRAM TRACING: CPT | Performed by: NURSE PRACTITIONER

## 2022-10-30 PROCEDURE — G0378 HOSPITAL OBSERVATION PER HR: HCPCS

## 2022-10-30 PROCEDURE — 36415 COLL VENOUS BLD VENIPUNCTURE: CPT

## 2022-10-30 PROCEDURE — 6370000000 HC RX 637 (ALT 250 FOR IP): Performed by: NURSE PRACTITIONER

## 2022-10-30 PROCEDURE — 84484 ASSAY OF TROPONIN QUANT: CPT

## 2022-10-30 PROCEDURE — 2580000003 HC RX 258: Performed by: NURSE PRACTITIONER

## 2022-10-30 RX ORDER — CLOPIDOGREL BISULFATE 75 MG/1
75 TABLET ORAL DAILY
Status: DISCONTINUED | OUTPATIENT
Start: 2022-10-30 | End: 2022-11-01 | Stop reason: HOSPADM

## 2022-10-30 RX ORDER — CLOPIDOGREL BISULFATE 75 MG/1
75 TABLET ORAL DAILY
Status: DISCONTINUED | OUTPATIENT
Start: 2022-10-31 | End: 2022-10-30

## 2022-10-30 RX ORDER — LEVOTHYROXINE SODIUM 112 UG/1
112 TABLET ORAL
Status: DISCONTINUED | OUTPATIENT
Start: 2022-10-31 | End: 2022-11-01 | Stop reason: HOSPADM

## 2022-10-30 RX ORDER — ONDANSETRON 2 MG/ML
4 INJECTION INTRAMUSCULAR; INTRAVENOUS EVERY 6 HOURS PRN
Status: DISCONTINUED | OUTPATIENT
Start: 2022-10-30 | End: 2022-11-01 | Stop reason: HOSPADM

## 2022-10-30 RX ORDER — MAGNESIUM HYDROXIDE/ALUMINUM HYDROXICE/SIMETHICONE 120; 1200; 1200 MG/30ML; MG/30ML; MG/30ML
30 SUSPENSION ORAL EVERY 6 HOURS PRN
Status: DISCONTINUED | OUTPATIENT
Start: 2022-10-30 | End: 2022-11-01 | Stop reason: HOSPADM

## 2022-10-30 RX ORDER — SODIUM CHLORIDE 9 MG/ML
INJECTION, SOLUTION INTRAVENOUS PRN
Status: DISCONTINUED | OUTPATIENT
Start: 2022-10-30 | End: 2022-11-01 | Stop reason: HOSPADM

## 2022-10-30 RX ORDER — POTASSIUM CHLORIDE 7.45 MG/ML
10 INJECTION INTRAVENOUS PRN
Status: DISCONTINUED | OUTPATIENT
Start: 2022-10-30 | End: 2022-11-01 | Stop reason: HOSPADM

## 2022-10-30 RX ORDER — ONDANSETRON 4 MG/1
4 TABLET, ORALLY DISINTEGRATING ORAL EVERY 8 HOURS PRN
Status: DISCONTINUED | OUTPATIENT
Start: 2022-10-30 | End: 2022-11-01 | Stop reason: HOSPADM

## 2022-10-30 RX ORDER — CYCLOBENZAPRINE HCL 10 MG
10 TABLET ORAL 3 TIMES DAILY PRN
Status: DISCONTINUED | OUTPATIENT
Start: 2022-10-30 | End: 2022-11-01 | Stop reason: HOSPADM

## 2022-10-30 RX ORDER — ACETAMINOPHEN 325 MG/1
650 TABLET ORAL EVERY 6 HOURS PRN
Status: DISCONTINUED | OUTPATIENT
Start: 2022-10-30 | End: 2022-10-31

## 2022-10-30 RX ORDER — SODIUM CHLORIDE 0.9 % (FLUSH) 0.9 %
5-40 SYRINGE (ML) INJECTION PRN
Status: DISCONTINUED | OUTPATIENT
Start: 2022-10-30 | End: 2022-11-01 | Stop reason: HOSPADM

## 2022-10-30 RX ORDER — HYDROCODONE BITARTRATE AND ACETAMINOPHEN 10; 325 MG/1; MG/1
1 TABLET ORAL EVERY 8 HOURS PRN
Status: DISCONTINUED | OUTPATIENT
Start: 2022-10-30 | End: 2022-11-01 | Stop reason: HOSPADM

## 2022-10-30 RX ORDER — POTASSIUM CHLORIDE 20 MEQ/1
40 TABLET, EXTENDED RELEASE ORAL PRN
Status: DISCONTINUED | OUTPATIENT
Start: 2022-10-30 | End: 2022-11-01 | Stop reason: HOSPADM

## 2022-10-30 RX ORDER — POLYETHYLENE GLYCOL 3350 17 G/17G
17 POWDER, FOR SOLUTION ORAL DAILY PRN
Status: DISCONTINUED | OUTPATIENT
Start: 2022-10-30 | End: 2022-11-01 | Stop reason: HOSPADM

## 2022-10-30 RX ORDER — GABAPENTIN 300 MG/1
300 CAPSULE ORAL 3 TIMES DAILY
Status: DISCONTINUED | OUTPATIENT
Start: 2022-10-30 | End: 2022-11-01 | Stop reason: HOSPADM

## 2022-10-30 RX ORDER — MAGNESIUM SULFATE IN WATER 40 MG/ML
2000 INJECTION, SOLUTION INTRAVENOUS PRN
Status: DISCONTINUED | OUTPATIENT
Start: 2022-10-30 | End: 2022-11-01 | Stop reason: HOSPADM

## 2022-10-30 RX ORDER — VITAMIN B COMPLEX
6000 TABLET ORAL DAILY
Status: DISCONTINUED | OUTPATIENT
Start: 2022-10-31 | End: 2022-11-01 | Stop reason: HOSPADM

## 2022-10-30 RX ORDER — ASPIRIN 81 MG/1
81 TABLET, CHEWABLE ORAL DAILY
Status: DISCONTINUED | OUTPATIENT
Start: 2022-10-31 | End: 2022-11-01 | Stop reason: HOSPADM

## 2022-10-30 RX ORDER — SODIUM CHLORIDE 0.9 % (FLUSH) 0.9 %
5-40 SYRINGE (ML) INJECTION EVERY 12 HOURS SCHEDULED
Status: DISCONTINUED | OUTPATIENT
Start: 2022-10-30 | End: 2022-11-01 | Stop reason: HOSPADM

## 2022-10-30 RX ORDER — ASCORBIC ACID 500 MG
500 TABLET ORAL DAILY
Status: DISCONTINUED | OUTPATIENT
Start: 2022-10-30 | End: 2022-11-01 | Stop reason: HOSPADM

## 2022-10-30 RX ORDER — ACETAMINOPHEN 650 MG/1
650 SUPPOSITORY RECTAL EVERY 6 HOURS PRN
Status: DISCONTINUED | OUTPATIENT
Start: 2022-10-30 | End: 2022-10-31

## 2022-10-30 RX ORDER — NITROGLYCERIN 0.4 MG/1
0.4 TABLET SUBLINGUAL EVERY 5 MIN PRN
Status: DISCONTINUED | OUTPATIENT
Start: 2022-10-30 | End: 2022-11-01 | Stop reason: HOSPADM

## 2022-10-30 RX ADMIN — SODIUM CHLORIDE, PRESERVATIVE FREE 5 ML: 5 INJECTION INTRAVENOUS at 21:23

## 2022-10-30 RX ADMIN — CLOPIDOGREL BISULFATE 75 MG: 75 TABLET ORAL at 21:19

## 2022-10-30 RX ADMIN — GABAPENTIN 300 MG: 300 CAPSULE ORAL at 21:19

## 2022-10-30 RX ADMIN — HYDROCODONE BITARTRATE AND ACETAMINOPHEN 1 TABLET: 10; 325 TABLET ORAL at 21:19

## 2022-10-30 ASSESSMENT — PAIN DESCRIPTION - DESCRIPTORS
DESCRIPTORS: ACHING
DESCRIPTORS: ACHING

## 2022-10-30 ASSESSMENT — PAIN DESCRIPTION - PAIN TYPE: TYPE: CHRONIC PAIN

## 2022-10-30 ASSESSMENT — PAIN - FUNCTIONAL ASSESSMENT
PAIN_FUNCTIONAL_ASSESSMENT: PREVENTS OR INTERFERES SOME ACTIVE ACTIVITIES AND ADLS
PAIN_FUNCTIONAL_ASSESSMENT: ACTIVITIES ARE NOT PREVENTED

## 2022-10-30 ASSESSMENT — PAIN DESCRIPTION - ORIENTATION
ORIENTATION: LOWER;MID
ORIENTATION: MID;LOWER

## 2022-10-30 ASSESSMENT — PAIN SCALES - GENERAL
PAINLEVEL_OUTOF10: 0
PAINLEVEL_OUTOF10: 6
PAINLEVEL_OUTOF10: 5

## 2022-10-30 ASSESSMENT — PAIN DESCRIPTION - LOCATION
LOCATION: BACK
LOCATION: BACK

## 2022-10-30 ASSESSMENT — PAIN DESCRIPTION - ONSET: ONSET: ON-GOING

## 2022-10-30 ASSESSMENT — PAIN DESCRIPTION - FREQUENCY: FREQUENCY: CONTINUOUS

## 2022-10-31 ENCOUNTER — TELEPHONE (OUTPATIENT)
Dept: CARDIOLOGY CLINIC | Age: 72
End: 2022-10-31

## 2022-10-31 ENCOUNTER — APPOINTMENT (OUTPATIENT)
Dept: NON INVASIVE DIAGNOSTICS | Age: 72
End: 2022-10-31
Attending: INTERNAL MEDICINE
Payer: MEDICARE

## 2022-10-31 LAB
ACT BLD: 729 SECS (ref 70–128)
ANION GAP SERPL CALC-SCNC: 5 MMOL/L (ref 2–11)
BUN SERPL-MCNC: 11 MG/DL (ref 8–23)
CALCIUM SERPL-MCNC: 9.4 MG/DL (ref 8.3–10.4)
CHLORIDE SERPL-SCNC: 111 MMOL/L (ref 101–110)
CHOLEST SERPL-MCNC: 232 MG/DL
CO2 SERPL-SCNC: 25 MMOL/L (ref 21–32)
CREAT SERPL-MCNC: 0.8 MG/DL (ref 0.6–1)
ECHO AO ASC DIAM: 3 CM
ECHO AO ASCENDING AORTA INDEX: 1.48 CM/M2
ECHO AO ROOT DIAM: 3.2 CM
ECHO AO ROOT INDEX: 1.58 CM/M2
ECHO AV AREA PEAK VELOCITY: 1.9 CM2
ECHO AV AREA VTI: 2 CM2
ECHO AV AREA/BSA PEAK VELOCITY: 0.9 CM2/M2
ECHO AV AREA/BSA VTI: 1 CM2/M2
ECHO AV MEAN GRADIENT: 3 MMHG
ECHO AV MEAN VELOCITY: 0.8 M/S
ECHO AV PEAK GRADIENT: 6 MMHG
ECHO AV PEAK VELOCITY: 1.2 M/S
ECHO AV VELOCITY RATIO: 0.67
ECHO AV VTI: 27.2 CM
ECHO BSA: 2.06 M2
ECHO BSA: 2.06 M2
ECHO IVC PROX: 1.7 CM
ECHO LA AREA 2C: 19.9 CM2
ECHO LA AREA 4C: 19.1 CM2
ECHO LA DIAMETER INDEX: 1.43 CM/M2
ECHO LA DIAMETER: 2.9 CM
ECHO LA MAJOR AXIS: 5.7 CM
ECHO LA MINOR AXIS: 5.1 CM
ECHO LA TO AORTIC ROOT RATIO: 0.91
ECHO LA VOL BP: 58 ML (ref 22–52)
ECHO LA VOL/BSA BIPLANE: 29 ML/M2 (ref 16–34)
ECHO LV E' LATERAL VELOCITY: 9 CM/S
ECHO LV E' SEPTAL VELOCITY: 8 CM/S
ECHO LV EDV 3D: 177 ML
ECHO LV EDV INDEX 3D: 87 ML/M2
ECHO LV EJECTION FRACTION 3D: 53 %
ECHO LV ESV 3D: 82 ML
ECHO LV ESV INDEX 3D: 40 ML/M2
ECHO LV FRACTIONAL SHORTENING: 22 % (ref 28–44)
ECHO LV INTERNAL DIMENSION DIASTOLE INDEX: 2.22 CM/M2
ECHO LV INTERNAL DIMENSION DIASTOLIC: 4.5 CM (ref 3.9–5.3)
ECHO LV INTERNAL DIMENSION SYSTOLIC INDEX: 1.72 CM/M2
ECHO LV INTERNAL DIMENSION SYSTOLIC: 3.5 CM
ECHO LV IVSD: 0.9 CM (ref 0.6–0.9)
ECHO LV MASS 2D: 132.8 G (ref 67–162)
ECHO LV MASS 3D INDEX: 64.5 G/M2
ECHO LV MASS 3D: 131 G
ECHO LV MASS INDEX 2D: 65.4 G/M2 (ref 43–95)
ECHO LV POSTERIOR WALL DIASTOLIC: 0.9 CM (ref 0.6–0.9)
ECHO LV RELATIVE WALL THICKNESS RATIO: 0.4
ECHO LVOT AREA: 2.8 CM2
ECHO LVOT AV VTI INDEX: 0.71
ECHO LVOT DIAM: 1.9 CM
ECHO LVOT MEAN GRADIENT: 1 MMHG
ECHO LVOT PEAK GRADIENT: 2 MMHG
ECHO LVOT PEAK VELOCITY: 0.8 M/S
ECHO LVOT STROKE VOLUME INDEX: 27.1 ML/M2
ECHO LVOT SV: 55 ML
ECHO LVOT VTI: 19.4 CM
ECHO MV A VELOCITY: 1.19 M/S
ECHO MV E DECELERATION TIME (DT): 260 MS
ECHO MV E VELOCITY: 0.78 M/S
ECHO MV E/A RATIO: 0.66
ECHO MV E/E' LATERAL: 8.67
ECHO MV E/E' RATIO (AVERAGED): 9.21
ECHO MV E/E' SEPTAL: 9.75
ECHO MV EROA PISA: 17.3 CM2
ECHO MV REGURGITANT ALIASING (NYQUIST) VELOCITY: 39 CM/S
ECHO MV REGURGITANT PEAK GRADIENT: 104 MMHG
ECHO MV REGURGITANT PEAK VELOCITY: 5.1 M/S
ECHO MV REGURGITANT RADIUS PISA: 0.6 CM
ECHO MV REGURGITANT VOLUME PISA: 3526.85 ML
ECHO MV REGURGITANT VTIA: 204 CM
ECHO RV BASAL DIMENSION: 3.7 CM
ECHO RV TAPSE: 2.1 CM (ref 1.7–?)
EKG ATRIAL RATE: 69 BPM
EKG DIAGNOSIS: NORMAL
EKG P AXIS: 50 DEGREES
EKG P-R INTERVAL: 176 MS
EKG Q-T INTERVAL: 422 MS
EKG QRS DURATION: 78 MS
EKG QTC CALCULATION (BAZETT): 452 MS
EKG R AXIS: 8 DEGREES
EKG T AXIS: 72 DEGREES
EKG VENTRICULAR RATE: 69 BPM
ERYTHROCYTE [DISTWIDTH] IN BLOOD BY AUTOMATED COUNT: 14.6 % (ref 11.9–14.6)
GLUCOSE SERPL-MCNC: 92 MG/DL (ref 65–100)
HCT VFR BLD AUTO: 41.6 % (ref 35.8–46.3)
HDLC SERPL-MCNC: 47 MG/DL (ref 40–60)
HDLC SERPL: 4.9 {RATIO}
HGB BLD-MCNC: 13.1 G/DL (ref 11.7–15.4)
LDLC SERPL CALC-MCNC: 161.6 MG/DL
MAGNESIUM SERPL-MCNC: 2.4 MG/DL (ref 1.8–2.4)
MCH RBC QN AUTO: 28.5 PG (ref 26.1–32.9)
MCHC RBC AUTO-ENTMCNC: 31.5 G/DL (ref 31.4–35)
MCV RBC AUTO: 90.6 FL (ref 82–102)
NRBC # BLD: 0 K/UL (ref 0–0.2)
PLATELET # BLD AUTO: 283 K/UL (ref 150–450)
PMV BLD AUTO: 11.1 FL (ref 9.4–12.3)
POTASSIUM SERPL-SCNC: 4 MMOL/L (ref 3.5–5.1)
RBC # BLD AUTO: 4.59 M/UL (ref 4.05–5.2)
SODIUM SERPL-SCNC: 141 MMOL/L (ref 133–143)
TRIGL SERPL-MCNC: 117 MG/DL (ref 35–150)
TROPONIN I SERPL HS-MCNC: 5.5 PG/ML (ref 0–14)
TSH W FREE THYROID IF ABNORMAL: 0.63 UIU/ML (ref 0.36–3.74)
VLDLC SERPL CALC-MCNC: 23.4 MG/DL (ref 6–23)
WBC # BLD AUTO: 7.4 K/UL (ref 4.3–11.1)

## 2022-10-31 PROCEDURE — 93306 TTE W/DOPPLER COMPLETE: CPT | Performed by: INTERNAL MEDICINE

## 2022-10-31 PROCEDURE — 6370000000 HC RX 637 (ALT 250 FOR IP): Performed by: NURSE PRACTITIONER

## 2022-10-31 PROCEDURE — 2709999900 HC NON-CHARGEABLE SUPPLY: Performed by: INTERNAL MEDICINE

## 2022-10-31 PROCEDURE — 6370000000 HC RX 637 (ALT 250 FOR IP): Performed by: INTERNAL MEDICINE

## 2022-10-31 PROCEDURE — G0378 HOSPITAL OBSERVATION PER HR: HCPCS

## 2022-10-31 PROCEDURE — 93005 ELECTROCARDIOGRAM TRACING: CPT | Performed by: INTERNAL MEDICINE

## 2022-10-31 PROCEDURE — 92920 PRQ TRLUML C ANGIOP 1ART&/BR: CPT | Performed by: INTERNAL MEDICINE

## 2022-10-31 PROCEDURE — 83735 ASSAY OF MAGNESIUM: CPT

## 2022-10-31 PROCEDURE — 76376 3D RENDER W/INTRP POSTPROCES: CPT | Performed by: INTERNAL MEDICINE

## 2022-10-31 PROCEDURE — 96365 THER/PROPH/DIAG IV INF INIT: CPT

## 2022-10-31 PROCEDURE — C1887 CATHETER, GUIDING: HCPCS | Performed by: INTERNAL MEDICINE

## 2022-10-31 PROCEDURE — 93458 L HRT ARTERY/VENTRICLE ANGIO: CPT | Performed by: INTERNAL MEDICINE

## 2022-10-31 PROCEDURE — 99220 PR INITIAL OBSERVATION CARE/DAY 70 MINUTES: CPT | Performed by: INTERNAL MEDICINE

## 2022-10-31 PROCEDURE — 84443 ASSAY THYROID STIM HORMONE: CPT

## 2022-10-31 PROCEDURE — 76376 3D RENDER W/INTRP POSTPROCES: CPT

## 2022-10-31 PROCEDURE — 99152 MOD SED SAME PHYS/QHP 5/>YRS: CPT | Performed by: INTERNAL MEDICINE

## 2022-10-31 PROCEDURE — 6360000002 HC RX W HCPCS: Performed by: INTERNAL MEDICINE

## 2022-10-31 PROCEDURE — 6360000002 HC RX W HCPCS: Performed by: NURSE PRACTITIONER

## 2022-10-31 PROCEDURE — 85347 COAGULATION TIME ACTIVATED: CPT

## 2022-10-31 PROCEDURE — C1769 GUIDE WIRE: HCPCS | Performed by: INTERNAL MEDICINE

## 2022-10-31 PROCEDURE — 85027 COMPLETE CBC AUTOMATED: CPT

## 2022-10-31 PROCEDURE — 2580000003 HC RX 258: Performed by: INTERNAL MEDICINE

## 2022-10-31 PROCEDURE — 2580000003 HC RX 258: Performed by: NURSE PRACTITIONER

## 2022-10-31 PROCEDURE — C1894 INTRO/SHEATH, NON-LASER: HCPCS | Performed by: INTERNAL MEDICINE

## 2022-10-31 PROCEDURE — 6360000004 HC RX CONTRAST MEDICATION: Performed by: INTERNAL MEDICINE

## 2022-10-31 PROCEDURE — 36415 COLL VENOUS BLD VENIPUNCTURE: CPT

## 2022-10-31 PROCEDURE — 99153 MOD SED SAME PHYS/QHP EA: CPT | Performed by: INTERNAL MEDICINE

## 2022-10-31 PROCEDURE — 80048 BASIC METABOLIC PNL TOTAL CA: CPT

## 2022-10-31 PROCEDURE — 80061 LIPID PANEL: CPT

## 2022-10-31 PROCEDURE — C1725 CATH, TRANSLUMIN NON-LASER: HCPCS | Performed by: INTERNAL MEDICINE

## 2022-10-31 PROCEDURE — 2500000003 HC RX 250 WO HCPCS: Performed by: INTERNAL MEDICINE

## 2022-10-31 RX ORDER — LIDOCAINE HYDROCHLORIDE 10 MG/ML
INJECTION, SOLUTION INFILTRATION; PERINEURAL PRN
Status: DISCONTINUED | OUTPATIENT
Start: 2022-10-31 | End: 2022-10-31 | Stop reason: HOSPADM

## 2022-10-31 RX ORDER — CLOPIDOGREL BISULFATE 75 MG/1
TABLET ORAL PRN
Status: DISCONTINUED | OUTPATIENT
Start: 2022-10-31 | End: 2022-10-31 | Stop reason: HOSPADM

## 2022-10-31 RX ORDER — BIVALIRUDIN 250 MG/5ML
INJECTION, POWDER, LYOPHILIZED, FOR SOLUTION INTRAVENOUS PRN
Status: DISCONTINUED | OUTPATIENT
Start: 2022-10-31 | End: 2022-10-31 | Stop reason: HOSPADM

## 2022-10-31 RX ORDER — SODIUM CHLORIDE 9 MG/ML
INJECTION, SOLUTION INTRAVENOUS CONTINUOUS
Status: ACTIVE | OUTPATIENT
Start: 2022-10-31 | End: 2022-11-01

## 2022-10-31 RX ORDER — SODIUM CHLORIDE 0.9 % (FLUSH) 0.9 %
5-40 SYRINGE (ML) INJECTION PRN
Status: DISCONTINUED | OUTPATIENT
Start: 2022-10-31 | End: 2022-11-01 | Stop reason: HOSPADM

## 2022-10-31 RX ORDER — SODIUM CHLORIDE 0.9 % (FLUSH) 0.9 %
5-40 SYRINGE (ML) INJECTION EVERY 12 HOURS SCHEDULED
Status: DISCONTINUED | OUTPATIENT
Start: 2022-10-31 | End: 2022-11-01 | Stop reason: HOSPADM

## 2022-10-31 RX ORDER — HEPARIN SODIUM 200 [USP'U]/100ML
INJECTION, SOLUTION INTRAVENOUS CONTINUOUS PRN
Status: DISCONTINUED | OUTPATIENT
Start: 2022-10-31 | End: 2022-10-31 | Stop reason: HOSPADM

## 2022-10-31 RX ORDER — ACETAMINOPHEN 325 MG/1
650 TABLET ORAL EVERY 4 HOURS PRN
Status: DISCONTINUED | OUTPATIENT
Start: 2022-10-31 | End: 2022-11-01 | Stop reason: HOSPADM

## 2022-10-31 RX ORDER — MIDAZOLAM HYDROCHLORIDE 1 MG/ML
INJECTION INTRAMUSCULAR; INTRAVENOUS PRN
Status: DISCONTINUED | OUTPATIENT
Start: 2022-10-31 | End: 2022-10-31 | Stop reason: HOSPADM

## 2022-10-31 RX ADMIN — HYDROCODONE BITARTRATE AND ACETAMINOPHEN 1 TABLET: 10; 325 TABLET ORAL at 05:47

## 2022-10-31 RX ADMIN — SERTRALINE 50 MG: 50 TABLET, FILM COATED ORAL at 09:00

## 2022-10-31 RX ADMIN — HYDROCODONE BITARTRATE AND ACETAMINOPHEN 1 TABLET: 10; 325 TABLET ORAL at 22:14

## 2022-10-31 RX ADMIN — LEVOTHYROXINE SODIUM 112 MCG: 0.11 TABLET ORAL at 05:42

## 2022-10-31 RX ADMIN — SODIUM CHLORIDE, PRESERVATIVE FREE 10 ML: 5 INJECTION INTRAVENOUS at 20:47

## 2022-10-31 RX ADMIN — GABAPENTIN 300 MG: 300 CAPSULE ORAL at 20:44

## 2022-10-31 RX ADMIN — METOPROLOL TARTRATE 12.5 MG: 25 TABLET, FILM COATED ORAL at 20:43

## 2022-10-31 RX ADMIN — Medication 10 ML: at 20:46

## 2022-10-31 RX ADMIN — CEFTRIAXONE 1000 MG: 1 INJECTION, POWDER, FOR SOLUTION INTRAMUSCULAR; INTRAVENOUS at 09:02

## 2022-10-31 RX ADMIN — SODIUM CHLORIDE, PRESERVATIVE FREE 5 ML: 5 INJECTION INTRAVENOUS at 09:03

## 2022-10-31 RX ADMIN — METOPROLOL TARTRATE 12.5 MG: 25 TABLET, FILM COATED ORAL at 17:17

## 2022-10-31 RX ADMIN — GABAPENTIN 300 MG: 300 CAPSULE ORAL at 09:00

## 2022-10-31 RX ADMIN — OXYCODONE HYDROCHLORIDE AND ACETAMINOPHEN 500 MG: 500 TABLET ORAL at 09:00

## 2022-10-31 RX ADMIN — ASPIRIN 81 MG: 81 TABLET, CHEWABLE ORAL at 09:00

## 2022-10-31 RX ADMIN — VITAMIN D, TAB 1000IU (100/BT) 6000 UNITS: 25 TAB at 08:59

## 2022-10-31 RX ADMIN — GABAPENTIN 300 MG: 300 CAPSULE ORAL at 13:56

## 2022-10-31 RX ADMIN — HYDROCODONE BITARTRATE AND ACETAMINOPHEN 1 TABLET: 10; 325 TABLET ORAL at 13:56

## 2022-10-31 RX ADMIN — CLOPIDOGREL BISULFATE 75 MG: 75 TABLET ORAL at 09:00

## 2022-10-31 ASSESSMENT — PAIN DESCRIPTION - LOCATION
LOCATION: BACK;LEG
LOCATION: BACK
LOCATION: BACK

## 2022-10-31 ASSESSMENT — PAIN DESCRIPTION - DESCRIPTORS
DESCRIPTORS: ACHING
DESCRIPTORS: SORE;THROBBING;ACHING

## 2022-10-31 ASSESSMENT — PAIN SCALES - GENERAL
PAINLEVEL_OUTOF10: 0
PAINLEVEL_OUTOF10: 6
PAINLEVEL_OUTOF10: 0
PAINLEVEL_OUTOF10: 6
PAINLEVEL_OUTOF10: 0
PAINLEVEL_OUTOF10: 0
PAINLEVEL_OUTOF10: 8

## 2022-10-31 ASSESSMENT — PAIN DESCRIPTION - ONSET
ONSET: ON-GOING
ONSET: ON-GOING

## 2022-10-31 ASSESSMENT — PAIN DESCRIPTION - FREQUENCY: FREQUENCY: CONTINUOUS

## 2022-10-31 ASSESSMENT — PAIN DESCRIPTION - ORIENTATION
ORIENTATION: MID;LOWER
ORIENTATION: MID;RIGHT
ORIENTATION: POSTERIOR;LOWER

## 2022-10-31 ASSESSMENT — PAIN DESCRIPTION - PAIN TYPE
TYPE: CHRONIC PAIN
TYPE: CHRONIC PAIN

## 2022-10-31 NOTE — PROGRESS NOTES
Report received from Codey moore, Cath Lab RN. Procedural finding communicated. Intra procedural medication administration reviewed. Progression of care discussed. Patient received into CPRU room 7, Post PCI. Access site without bleeding or swelling. Yes    Patient instructed to limit movement of right upper extremity. Routine post procedural vital signs & site assessment initiated.

## 2022-10-31 NOTE — PROGRESS NOTES
TRANSFER - OUT REPORT:    Verbal report given to LÓPEZ Rosales on [place]  being transferred to telemetry for routine progression of patient care       Report consisted of patient's Situation, Background, Assessment and   Recommendations(SBAR). Information from the following report(s) Nurse Handoff Report was reviewed with the receiving nurse. Lines:   Peripheral IV 10/30/22 Left Forearm (Active)   Site Assessment Clean, dry & intact 10/31/22 1340   Line Status Normal saline locked 10/31/22 1340   Line Care Connections checked and tightened 10/31/22 1340   Phlebitis Assessment No symptoms 10/31/22 1340   Infiltration Assessment 0 10/31/22 1340   Dressing Status Clean, dry & intact 10/31/22 1340   Dressing Type Transparent 10/31/22 1340        Opportunity for questions and clarification was provided.       Patient transported with:    Registered Nurse

## 2022-10-31 NOTE — H&P
[facility] CARDIOLOGY   History & Physical                 Primary Cardiologist: Dr. Jaimee Longoria    Primary Care Physician: Dr. Lizz Jones    Admitting Physician: Dr. Tatiana Chew:     Patient is a 81 y.o.  female with PMHx of CAD (s/p 13 stents), HTN, HLD, Hypothyroidism, Anxiety and Chronic back pain who presented to the ED at [facility] with c/o slow HR. Pt states that she has noted he HR to be in the 35-50 range at times on her home oximeter on several occasions. She reports that this AM around 1000 she felt some heaviness in her chest. When she checked her HR it was 36. She reports feeling mildly SOB as well. However, states she has the same s/s of chest heaviness and SOB with her anxiety and initially thought anxiety was the issue. However, HR sustained slow for a few hours and she ultimately went to the ED around 1300. She states while in the ED, her HR was \"normal\" on the monitor but the pulse ox was showing her HR dropping into the 30s and then return to normal. She denies feeling symptomatic during these events and was only aware they were happening because the monitor would alarm. She states no c/o dizziness, HA, syncope, palpitations, N/V, diaphoresis. She has no CP. She did take her Toprol this AM. Has been off Plavix x 3 days due to refill issue. In the ED: EKGs there report HR 75 and 82. Trop <0.01. Labs normal other than UA + for UTI. She was given 1g IV Rocephin. She was transferred to [facility] for further evaluation of her bradycardia. Pt states that she has a hx of CAD and MI. She has 13 stents. States last stent approx 10yrs ago. Reports previous stenting done in [facility] as well. States no known hx of arrhythmias. She is on Toprol XL 100mg daily and has been for years. States she has been on Plavix for years as well. Was told by Dr. Sajan Velázquez that she could never come off the Plavix. She reports having stroke like symptoms with aphasia in 12/2020.  Was seen by Neuro and full w/u done without identified stroke. She was changed from Plavix to Brilinta by Neuro as she was on the Plavix when strike-like symptoms occurred feeling she was potentially a non-responder to the Plavix. Labs showed that she did respond to Plavix but she states Neuro wanted her to continue the [address]. However, states she was changed back to the Plavix by Dr. Roger Howe. She states she takes an occasional Lasix for LE edema but denies recently increased edema or Lasix use. Daughter (who is a RN at [employer]) arrived and adds to the history. States Pt does have 13 stents and all stents are located in her RCA and are \"bunched up together. \" States she has \"metal stents\" and during last [facility] there was issues with being unable to cross the stents because the wires kept \"getting caught on the stents\" and she had bleeding during the procedure. She is very anxious about having any procedures because of this experience. She brought in an EKG from the ED that showed SR with PVCs. She states Pt has not had issues with PVCs before.      Past Medical History:   Diagnosis Date    Angina pectoris ([address]     Backache, unspecified 7/9/2013    Chest pain     Coronary atherosclerosis of native coronary artery 8/3/2015    Degeneration of intervertebral disc, site unspecified 7/9/2013    Essential hypertension, benign 8/3/2015    Family history of other musculoskeletal diseases(V17.89) 7/9/2013    History of MI (myocardial infarction)     Mixed hyperlipidemia 8/3/2015    Other forms of migraine, without mention of intractable migraine without mention of status migrainosus 7/9/2013    Palpitations     Spasm of muscle 8/3/2015    Syncope and collapse     Unspecified hypothyroidism 8/3/2015      Past Surgical History:   Procedure Laterality Date    HYSTERECTOMY, VAGINAL      [facility] STEROTACTIC LOC BREAST BIOPSY LEFT Left 5/31/2017    STEVIE STEROTACTIC LOC BREAST BIOPSY LEFT 5/31/2017 [facility]    [place] CARDIAC SURG PROCEDURE UNLIST  2004    MI 3 stents    TONSILLECTOMY AND ADENOIDECTOMY        Allergies   Allergen Reactions    Monosodium Glutamate Shortness Of Breath    Atorvastatin Other (See Comments)    Isosorbide Nitrate Other (See Comments)    Niacin Other (See Comments)    Rosuvastatin Other (See Comments)     Social History     Tobacco Use    Smoking status: Never    Smokeless tobacco: Never   Substance Use Topics    Alcohol use: No     Alcohol/week: 0.0 standard drinks      FH:   Family History   Problem Relation Age of Onset    Lung Disease Mother     Psychiatric Disorder Son         schizoaffective    Heart Disease Mother         Review of Systems  General: no recent weight change, no weakness, no fatigue, no fever or chills, no diaphoresis, no change in appetite or ADLs  Skin: no rashes, wounds, sores or other skin changes  HEENT: no headache, dizziness, lightheadedness, vision changes, hearing changes, sinus pressure/pain/congestion, bleeding gums or sore throat  Neck: no swollen glands, goiter, pain or stiffness  Respiratory: no cough, no congestion, no sputum, no hemoptysis, +dyspnea, no wheezing  Cardiovascular: + as per HPI, no palpitations, syncope, orthopnea, PND  Gastrointestinal: no increased reflux, no constipation, diarrhea, liver problems, GI bleeding, no abdominal pain or distension, no N/V  Urinary: no frequency, urgency, hematuria, burning/pain with urination, flank pain or difficulty urinating  Peripheral Vascular: no claudication, leg cramps, prior DVTs, no new swelling of BLE, no color change, or swelling with redness or tenderness  Musculoskeletal: no new muscle or joint pain/stiffness, joint swelling, erythema of joints, or back pain  Psychiatric: no increased depression, anxiety or excessive stress  Neurological: no sensory or motor loss, seizures, syncope, tremors, numbness, tingling, no changes in mood, attention, or speech, no changes in orientation, memory, insight, or judgment.    Hematologic: no anemia, no easy bruising or bleeding  Endocrine: +thyroid problems, no heat or cold intolerance, excessive sweating, polyuria, polydipsia, no diabetes. Objective:       BP (!) 142/75   Temp 97.9 °F (36.6 °C) (Oral)   Resp 17   Ht 5' 7\" (1.702 m)   Wt 198 lb 8 oz (90 kg)   SpO2 99%   BMI 31.09 kg/m²     No intake/output data recorded. No intake/output data recorded. Physical Exam:  General: well developed, well nourished, NAD, resting comfortably  HEENT: PERRLA, no abnormalities noted, sclera clear, EOMs intact  Neck: supple, no JVD, trachea midline  Heart: S1S2 with RRR, frequent ectopy without murmurs, rubs or gallops  Lungs: clear to auscultation bilaterally, normal effort on RA, no wheezing or rales  Abd: soft, nontender, nondistended, +bowel sounds  Ext: warm, 1+ edema bilat, calves supple/nontender, pulses 2+ bilaterally  Skin: warm and dry, intact to view  Psychiatric: appropriate mood and affect, cooperative  Neurologic: A&O X 3, moves all 4 equally, CNs intact      ECG: SR with bigeminal PVCs, no acute ST changes    ECHO: ordered and pending    CXR: done at [facility] -- no acute abnormalities per report      Data Review:    No results found for this or any previous visit (from the past 24 hour(s)). Assessment/Plan:   Principal Problem:    Bradycardia -- Pt having bigeminal PVCs that appear to be perfusing at this time, potentially her home oximeter not counting PVCs ? ? -- daughter states HR has never been low while on the monitor only on the pulse ox, will hold her Toprol for now and monitor, lytes ok - repeat in AM, check TSH, check ECHO, discussed poss need for further procedures/LHC pending clinical course    Active Problems:    Chronic low back pain -- cont home meds      Anxiety -- address PRN      Essential hypertension, benign -- holding Toprol, monitor BP off med      Chronic ischemic heart disease -- check ECHO in AM      CAD in native artery -- cont ASA, restart Plavix tonight (has missed 3 days), holding BB with bradycardia, intolerant of statins      Acquired hypothyroidism -- cont Synthroid, check TSH in AM      Mixed hyperlipidemia -- intolerant of statins, on Repatha as OP, check lipids      Acute cystitis with hematuria -- cont IV Rocephin          ELAINA Diaz CNP-C  10/30/2022  8:46 PM     ATTENDING ADDENDUM:    In this split/shared evaluation I performed/reviewed the patients's H&P, available images, labs, non-invasive studies and discussed the case in detail with the ACP;  performed a medically appropriate history and exam, counseled and educated the patient and/or family members, ordered and reviewed medications, tests or procedures, documented information in EMR, and independently interpreted images and coordinated care. Personal Time:     39 minutes, which equates to greater than 50% of time involved in patient's consultation and care management. I agree with above note by physician extender. Key findings are:  No CHF but mild chest tightness associated with low heart rate on pulse oximeter, no presyncope or syncope. Known multivessel disease with multiple stents especially in the RCA, all done at [facility], last PCI at least 10 years ago by her report. No recent accelerating angina but chest tightness last night in the setting of bradycardia by pulse oximeter. Currently no bradycardia and no bradycardia on telemetry overnight but with frequent PACs and PVCs. Last Toprol- mg dose was yesterday. Discussed monitoring with telemetry and proceeding with definitive coronary evaluation given chest discomfort and potential bradycardia arrhythmia. She wishes to proceed. Benefits and risk discussed.     Current Facility-Administered Medications   Medication Dose Route Frequency Provider Last Rate Last Admin    ascorbic acid (VITAMIN C) tablet 500 mg  500 mg Oral Daily Shu Kebede APRN - CNP        Vitamin D (CHOLECALCIFEROL) tablet 6,000 Units  6,000 Units Oral Daily Melissa Stevie, APRN - CNP        cyclobenzaprine (FLEXERIL) tablet 10 mg  10 mg Oral TID PRN Melissa Stevie, APRN - CNP        gabapentin (NEURONTIN) capsule 300 mg  300 mg Oral TID Melissa Stevie, APRN - CNP   300 mg at 10/30/22 2119    HYDROcodone-acetaminophen (NORCO)  MG per tablet 1 tablet  1 tablet Oral Q8H PRN Melissa Stevie, APRN - CNP   1 tablet at 10/31/22 0547    levothyroxine (SYNTHROID) tablet 112 mcg  112 mcg Oral QAM AC Melissa Stevie, APRN - CNP   112 mcg at 10/31/22 0542    sertraline (ZOLOFT) tablet 50 mg  50 mg Oral Daily Melissa Stevie, APRN - CNP        sodium chloride flush 0.9 % injection 5-40 mL  5-40 mL IntraVENous 2 times per day Melissa Stevie, APRN - CNP   5 mL at 10/30/22 2123    sodium chloride flush 0.9 % injection 5-40 mL  5-40 mL IntraVENous PRN Melissa Stevie, APRN - CNP        0.9 % sodium chloride infusion   IntraVENous PRN Melissa Stevie, APRN - CNP        ondansetron (ZOFRAN-ODT) disintegrating tablet 4 mg  4 mg Oral Q8H PRN Melissa Stevie, APRN - CNP        Or    ondansetron (ZOFRAN) injection 4 mg  4 mg IntraVENous Q6H PRN Melissa Stevie, APRN - CNP        acetaminophen (TYLENOL) tablet 650 mg  650 mg Oral Q6H PRN Melissa Stevie, APRN - CNP        Or    acetaminophen (TYLENOL) suppository 650 mg  650 mg Rectal Q6H PRN Melissa Stevie, APRN - CNP        polyethylene glycol (GLYCOLAX) packet 17 g  17 g Oral Daily PRN Melissa Stevie, APRN - CNP        aspirin chewable tablet 81 mg  81 mg Oral Daily Melissa Stevie, APRN - CNP        potassium chloride (KLOR-CON M) extended release tablet 40 mEq  40 mEq Oral PRN Melissa Stevie, APRN - CNP        Or    potassium bicarb-citric acid (EFFER-K) effervescent tablet 40 mEq  40 mEq Oral PRN Melissa Stevie, APRN - CNP        Or    potassium chloride 10 mEq/100 mL IVPB (Peripheral Line)  10 mEq IntraVENous PRN Melissa Stevie, APRN - CNP        magnesium sulfate 2000 mg in 50 mL IVPB premix  2,000 mg IntraVENous PRN ELAINA Layton CNP        aluminum & magnesium hydroxide-simethicone (MAALOX) 200-200-20 MG/5ML suspension 30 mL  30 mL Oral Q6H PRN ELAINA Layton CNP        nitroGLYCERIN (NITROSTAT) SL tablet 0.4 mg  0.4 mg SubLINGual Q5 Min PRN ELAINA Layton CNP        clopidogrel (PLAVIX) tablet 75 mg  75 mg Oral Daily ELAINA Layton CNP   75 mg at 10/30/22 2119    cefTRIAXone (ROCEPHIN) 1,000 mg in sodium chloride 0.9 % 50 mL IVPB mini-bag  1,000 mg IntraVENous Q24H ELAINA Layton CNP         Allergies   Allergen Reactions    Monosodium Glutamate Shortness Of Breath    Atorvastatin Other (See Comments)    Isosorbide Nitrate Other (See Comments)    Niacin Other (See Comments)    Rosuvastatin Other (See Comments)     Patient Active Problem List    Diagnosis Date Noted    Chronic low back pain 10/30/2022     Priority: High    Anxiety 10/30/2022     Priority: High    Acute cystitis with hematuria 10/30/2022     Priority: High    Bradycardia 10/30/2022     Priority: Medium    Hypertension 01/06/2021    Expressive aphasia 01/06/2021    Chronic ischemic heart disease 09/04/2020    Hypercholesteremia 01/31/2019    Angina pectoris ([address]     Chest pain     Syncope and collapse     Palpitations     Essential hypertension, benign 08/03/2015    CAD in native artery 08/03/2015     Overview Note:     CP is better. Nuke was neg for ischemia.         Acquired hypothyroidism 08/03/2015    Mixed hyperlipidemia 08/03/2015    Spasm of muscle 08/03/2015     Past Surgical History:   Procedure Laterality Date    HYSTERECTOMY, VAGINAL      STEVIE STEROTACTIC LOC BREAST BIOPSY LEFT Left 5/31/2017    STEVIE STEROTACTIC LOC BREAST BIOPSY LEFT 5/31/2017 [facility]    [place] CARDIAC SURG PROCEDURE UNLIST  2004    MI 3 stents    TONSILLECTOMY AND ADENOIDECTOMY       Social History     Tobacco Use    Smoking status: Never    Smokeless tobacco: Never   Substance Use Topics    Alcohol use: No     Alcohol/week: 0.0 standard drinks       REVIEW OF SYSTEMS:    General: no recent weight loss/gain,no weakness/fatigue, denies fever or chills   Skin: no rashes, lumps, or other skin changes   HEENT: no headache, dizziness, lightheadedness, vision changes, hearing changes   Neck: no swollen glands, goiter, pain or stiffness   Respiratory: no cough, sputum, hemoptysis, no shortness of breath, no dyspnea on exertion, wheezing   Cardiovascular: + chest tight, +palpitations, no orthopnea, paroxysmal nocturnal dyspnea or peripheral edema   Gastrointestinal:  heartburn, change of appetite, nausea, change in bowel habits  Urinary: no frequency, urgency , hematuria, burning/pain  Peripheral Vascular: no claudication, leg cramps, prior DVTs  Musculoskeletal: no muscle or joint pain/stiffness  Psychiatric: no depression, mental disorders, or excessive stress   Neurological: no history of CVA, vertigo. Hematologic: no anemia, easy bruising or bleeding   Endocrine: no diabetes, thyroid problems, heat or cold intolerance, excessive sweating, polyuria, polydipsia        PHYSICAL EXAM:    Vitals:    10/31/22 0042 10/31/22 0547 10/31/22 0548 10/31/22 0549   BP: 107/62  90/68    Pulse: 70  67    Resp: 18 19 18    Temp: 97.6 °F (36.4 °C)  97.8 °F (36.6 °C)    TempSrc: Oral  Oral    SpO2: 94%  95%    Weight:    202 lb (91.6 kg)   Height:           General: Well Developed, Well Nourished, No Acute Distress  HEENT: pupils equal and round, no abnormalities noted  Neck: supple, no JVD, no carotid bruits  Heart: S1S2 with RRR without murmurs or gallops  Lungs: Clear throughout auscultation bilaterally  Abd: soft, nontender, nondistended  Ext: No edema distally  Skin: warm and dry  Psychiatric: Normal mood and affect  Neurologic: Alert and oriented X 3, cranial nerves II thru XII grossly intact and symmetric      ASSESSMENT AND PLAN:  Active Hospital Problems  Chest tightness-resolved, currently asymptomatic and lying flat in bed. Enzymes negative.   Left heart cath later today given known significant CAD with numerous stents and intermittent bradycardia and chest tightness      Anxiety-controlled, titrate meds as needed      Acute cystitis with hematuria-antibiotics IV while in the hospital, short course of p.o. antibiotics and outpatient surveillance      *Bradycardia-follow telemetry, holding Toprol for now. May be falsely recorded on pulse oximeter with frequent ectopy (PACs and PVCs). No true bradycardia overnight. Follow telemetry      Chronic ischemic heart disease-multiple prior stents, see above. Left heart catheterization later today      Essential hypertension, benign-stable continue meds and titrate as needed      CAD in native artery-unknown, chest tightness yesterday prior to admission. Known coronary disease, see above. Left heart cath today. Acquired hypothyroidism-Home meds with outpatient surveillance      Mixed hyperlipidemia-Home meds with outpatient surveillance       A. Burnell Schirmer, MD  [facility] Cardiology  Pager [phone]

## 2022-10-31 NOTE — PROGRESS NOTES
TRANSFER - IN REPORT:    Verbal report received from Florin Bahena RN on [employer] being received from [facility] for routine progression of care. Report consisted of patients Situation, Background, Assessment and Recommendations(SBAR). Information from the following report(s) SBAR, Kardex, ED Summary, Procedure Summary, Intake/Output, MAR, and Recent Results was reviewed. Opportunity for questions and clarification was provided. Assessment completed upon patients arrival to unit and care assumed. Patient received to room 304. Patient connected to monitor and assessment completed. Plan of care reviewed. Patient oriented to room and call light. Patient aware to use call light to communicate any chest pain or needs. Admission skin assessment completed with second RN and reveals the following:     Blanchable red, opened area noted on sacrum -allevyn placed. Heels are clean, dry, and intact. Skin assessed with [facility], RN.

## 2022-10-31 NOTE — PLAN OF CARE
Problem: Discharge Planning  Goal: Discharge to home or other facility with appropriate resources  Outcome: Progressing     Problem: Pain  Goal: Verbalizes/displays adequate comfort level or baseline comfort level  Outcome: Progressing  Flowsheets (Taken 10/31/2022 0012)  Verbalizes/displays adequate comfort level or baseline comfort level:   Encourage patient to monitor pain and request assistance   Assess pain using appropriate pain scale   Administer analgesics based on type and severity of pain and evaluate response   Implement non-pharmacological measures as appropriate and evaluate response   Consider cultural and social influences on pain and pain management   Notify Licensed Independent Practitioner if interventions unsuccessful or patient reports new pain     Problem: Safety - Adult  Goal: Free from fall injury  Outcome: Progressing

## 2022-10-31 NOTE — TELEPHONE ENCOUNTER
Patient is requesting her care be transferred to another cardiologist in this office. Please call patient and advise.

## 2022-10-31 NOTE — PROGRESS NOTES
TRANSFER - IN REPORT:    Verbal report received from Fredy Lopez RN on [employer] being received from [facility] for routine progression of patient care      Report consisted of patients Situation, Background, Assessment and Recommendations(SBAR). Information from the following report(s) SBAR, Kardex, Procedure Summary, and Cardiac Rhythm SR  was reviewed with the receiving nurse. Opportunity for questions and clarification was provided. Assessment completed upon patients arrival to unit and care assumed.

## 2022-10-31 NOTE — PROGRESS NOTES
TRANSFER - OUT REPORT:    PCI/LHC w/ Dr. Sam Haas  Balloon to prox-distal RCA  R radial access  TR band to R radial @ 15mL  Hematoma noted to R radial access site - manual pressure held - hematoma resolved - R radial pulse present and strong    Heparin 5000 units  Versed 2mg  Fentanyl 50mcg  Plavix 600mg  Mylanta 30mL   Angiomax stopped @ 1508    Verbal report given to RN on Kv[employer] Richard  being transferred to [facility] for routine progression of patient care       Report consisted of patient's Situation, Background, Assessment and   Recommendations(SBAR). Information from the following report(s) Nurse Handoff Report and MAR was reviewed with the receiving nurse. Lines:   Peripheral IV 10/30/22 Left Forearm (Active)   Site Assessment Clean, dry & intact 10/31/22 0805   Line Status Normal saline locked 10/31/22 0805   Line Care Connections checked and tightened 10/31/22 0805   Phlebitis Assessment No symptoms 10/31/22 0805   Infiltration Assessment 0 10/31/22 0805   Dressing Status Clean, dry & intact 10/31/22 0805   Dressing Type Transparent 10/31/22 0805        Opportunity for questions and clarification was provided.       Patient transported with:  [employer]

## 2022-10-31 NOTE — CARE COORDINATION
Pt presented to the ED at [facility] c/o slow HR. Pt was transferred to [facility] for further evaluation of bradycardia. Has a PMHx of CAD, HTN, HLD, Hypothyroidism, Anxiety and chronic back pain. Pt indep with her ADLs. Denies DME need. On RA> Denies current home care services. PCP confirmed. Insurance verified and able to afford her meds. No discharge needs identified at this time but will remain available. 10/31/22 1141   Service Assessment   Patient Orientation Alert and Oriented   Cognition Alert   History Provided By Patient   Primary Caregiver Self   Support Systems Children;Family Members;[employer] [employer];Friends/Neighbors   PCP Verified by CM Yes  Ammykaiden Liter)   Prior Functional Level Independent in ADLs/IADLs   Current Functional Level Independent in ADLs/IADLs   Can patient return to prior living arrangement Yes   Ability to make needs known: Good   Family able to assist with home care needs: Yes   Would you like for me to discuss the discharge plan with any other family members/significant others, and if so, who? No   Financial Resources Medicare   Social/Functional History   Lives With Family   Type of [address] [facility] Two level   [address] [facility]    Active  Yes   Mode of Transportation Car   Occupation Retired   Discharge Planning   [facility] Prior To Admission None   Potential Assistance Needed N/A   DME Ordered? No   Potential Assistance Purchasing Medications No   Type of Home Care Services None   Patient expects to be discharged to: [address] Discharge   Transition of Care Consult (CM Consult) Discharge [facility] Discharge None   [facility] Information Provided?  No   Mode of Transport at Discharge Other (see comment)  (Family)   Confirm Follow Up Transport Self

## 2022-10-31 NOTE — TELEPHONE ENCOUNTER
Pt.would like to switch cardiologist.Her and  just do not seem to click. She is currently in the hospital having cath. I told her to let discharging physician know this and see if they can schedule w/different provider. If not call me back after her discharge. Pt.v/u.

## 2022-11-01 VITALS
HEIGHT: 67 IN | HEART RATE: 77 BPM | WEIGHT: 196 LBS | BODY MASS INDEX: 30.76 KG/M2 | OXYGEN SATURATION: 92 % | SYSTOLIC BLOOD PRESSURE: 122 MMHG | RESPIRATION RATE: 17 BRPM | TEMPERATURE: 97.8 F | DIASTOLIC BLOOD PRESSURE: 77 MMHG

## 2022-11-01 LAB
ANION GAP SERPL CALC-SCNC: 6 MMOL/L (ref 2–11)
BUN SERPL-MCNC: 15 MG/DL (ref 8–23)
CALCIUM SERPL-MCNC: 9 MG/DL (ref 8.3–10.4)
CHLORIDE SERPL-SCNC: 108 MMOL/L (ref 101–110)
CO2 SERPL-SCNC: 24 MMOL/L (ref 21–32)
CREAT SERPL-MCNC: 0.9 MG/DL (ref 0.6–1)
ERYTHROCYTE [DISTWIDTH] IN BLOOD BY AUTOMATED COUNT: 14.6 % (ref 11.9–14.6)
GLUCOSE SERPL-MCNC: 90 MG/DL (ref 65–100)
HCT VFR BLD AUTO: 40.2 % (ref 35.8–46.3)
HGB BLD-MCNC: 12.7 G/DL (ref 11.7–15.4)
MAGNESIUM SERPL-MCNC: 2.6 MG/DL (ref 1.8–2.4)
MCH RBC QN AUTO: 28.7 PG (ref 26.1–32.9)
MCHC RBC AUTO-ENTMCNC: 31.6 G/DL (ref 31.4–35)
MCV RBC AUTO: 91 FL (ref 82–102)
NRBC # BLD: 0 K/UL (ref 0–0.2)
PLATELET # BLD AUTO: 278 K/UL (ref 150–450)
PMV BLD AUTO: 11.2 FL (ref 9.4–12.3)
POTASSIUM SERPL-SCNC: 3.8 MMOL/L (ref 3.5–5.1)
RBC # BLD AUTO: 4.42 M/UL (ref 4.05–5.2)
SODIUM SERPL-SCNC: 138 MMOL/L (ref 133–143)
WBC # BLD AUTO: 8.6 K/UL (ref 4.3–11.1)

## 2022-11-01 PROCEDURE — 6370000000 HC RX 637 (ALT 250 FOR IP): Performed by: INTERNAL MEDICINE

## 2022-11-01 PROCEDURE — 99217 PR OBSERVATION CARE DISCHARGE MANAGEMENT: CPT | Performed by: INTERNAL MEDICINE

## 2022-11-01 PROCEDURE — 83735 ASSAY OF MAGNESIUM: CPT

## 2022-11-01 PROCEDURE — G0378 HOSPITAL OBSERVATION PER HR: HCPCS

## 2022-11-01 PROCEDURE — 80048 BASIC METABOLIC PNL TOTAL CA: CPT

## 2022-11-01 PROCEDURE — 6370000000 HC RX 637 (ALT 250 FOR IP): Performed by: NURSE PRACTITIONER

## 2022-11-01 PROCEDURE — 85027 COMPLETE CBC AUTOMATED: CPT

## 2022-11-01 PROCEDURE — 2580000003 HC RX 258: Performed by: NURSE PRACTITIONER

## 2022-11-01 PROCEDURE — 36415 COLL VENOUS BLD VENIPUNCTURE: CPT

## 2022-11-01 PROCEDURE — 6360000002 HC RX W HCPCS: Performed by: NURSE PRACTITIONER

## 2022-11-01 RX ORDER — CLOPIDOGREL BISULFATE 75 MG/1
75 TABLET ORAL DAILY
Qty: 30 TABLET | Refills: 11 | Status: SHIPPED | OUTPATIENT
Start: 2022-11-01 | End: 2022-11-29 | Stop reason: SDUPTHER

## 2022-11-01 RX ORDER — CLOPIDOGREL BISULFATE 75 MG/1
75 TABLET ORAL DAILY
Qty: 30 TABLET | Refills: 11 | Status: SHIPPED | OUTPATIENT
Start: 2022-11-01 | End: 2022-11-01 | Stop reason: SDUPTHER

## 2022-11-01 RX ORDER — ASPIRIN 81 MG/1
81 TABLET, CHEWABLE ORAL DAILY
Qty: 30 TABLET | Refills: 11 | COMMUNITY
Start: 2022-11-01

## 2022-11-01 RX ORDER — METOPROLOL SUCCINATE 25 MG/1
50 TABLET, EXTENDED RELEASE ORAL DAILY
Status: DISCONTINUED | OUTPATIENT
Start: 2022-11-01 | End: 2022-11-01 | Stop reason: HOSPADM

## 2022-11-01 RX ORDER — CEFPODOXIME PROXETIL 100 MG/1
100 TABLET, FILM COATED ORAL 2 TIMES DAILY
Qty: 10 TABLET | Refills: 0 | Status: SHIPPED | OUTPATIENT
Start: 2022-11-01 | End: 2022-11-06

## 2022-11-01 RX ORDER — METOPROLOL SUCCINATE 50 MG/1
50 TABLET, EXTENDED RELEASE ORAL DAILY
Qty: 30 TABLET | Refills: 5 | Status: SHIPPED | OUTPATIENT
Start: 2022-11-01

## 2022-11-01 RX ORDER — PRAVASTATIN SODIUM 40 MG
40 TABLET ORAL DAILY
Qty: 30 TABLET | Refills: 5 | Status: SHIPPED | OUTPATIENT
Start: 2022-11-01

## 2022-11-01 RX ADMIN — LEVOTHYROXINE SODIUM 112 MCG: 0.11 TABLET ORAL at 05:10

## 2022-11-01 RX ADMIN — METOPROLOL SUCCINATE 50 MG: 25 TABLET, EXTENDED RELEASE ORAL at 09:32

## 2022-11-01 RX ADMIN — GABAPENTIN 300 MG: 300 CAPSULE ORAL at 09:34

## 2022-11-01 RX ADMIN — CEFTRIAXONE 1000 MG: 1 INJECTION, POWDER, FOR SOLUTION INTRAMUSCULAR; INTRAVENOUS at 09:35

## 2022-11-01 RX ADMIN — SODIUM CHLORIDE, PRESERVATIVE FREE 5 ML: 5 INJECTION INTRAVENOUS at 09:36

## 2022-11-01 RX ADMIN — ASPIRIN 81 MG: 81 TABLET, CHEWABLE ORAL at 09:32

## 2022-11-01 RX ADMIN — CLOPIDOGREL BISULFATE 75 MG: 75 TABLET ORAL at 09:34

## 2022-11-01 RX ADMIN — SERTRALINE 50 MG: 50 TABLET, FILM COATED ORAL at 09:32

## 2022-11-01 RX ADMIN — HYDROCODONE BITARTRATE AND ACETAMINOPHEN 1 TABLET: 10; 325 TABLET ORAL at 09:33

## 2022-11-01 RX ADMIN — VITAMIN D, TAB 1000IU (100/BT) 6000 UNITS: 25 TAB at 09:32

## 2022-11-01 RX ADMIN — OXYCODONE HYDROCHLORIDE AND ACETAMINOPHEN 500 MG: 500 TABLET ORAL at 09:34

## 2022-11-01 ASSESSMENT — PAIN SCALES - GENERAL
PAINLEVEL_OUTOF10: 8
PAINLEVEL_OUTOF10: 0
PAINLEVEL_OUTOF10: 8
PAINLEVEL_OUTOF10: 0

## 2022-11-01 ASSESSMENT — PAIN DESCRIPTION - ORIENTATION
ORIENTATION: RIGHT;LEFT;LOWER
ORIENTATION: MID;LEFT;RIGHT

## 2022-11-01 ASSESSMENT — PAIN DESCRIPTION - LOCATION
LOCATION: BACK;LEG
LOCATION: BACK;LEG

## 2022-11-01 NOTE — DISCHARGE SUMMARY
Patient seen and examined. Doing well without chest discomfort, shortness of breath. Ambulating halls without symptoms. Blood pressure 122/77, pulse 77, temperature 97.8 °F (36.6 °C), temperature source Oral, resp. rate 17, height 5' 7\" (1.702 m), weight 196 lb (88.9 kg), SpO2 92 %. CV-  RRR without murmur, rub, gallop  Lungs- clear bilaterally  Abd- soft, NT, ND  Ext- No edema, right groin clean, dry, no ecchymosis or hematome    Recent Labs     11/01/22  0344      K 3.8   BUN 15      HGB 12.7   HCT 40.2   WBC 8.6        22  Assessment/Plan:  1) CAD- s/p PCI. Importance of DAPT daily stressed    2) Continue aggressive risk factor modification in the outpatient setting. See D/C summary for discharge medications and home therapies. 3) Followup with [facility] in 2 weeks, primary care MD in 4-6 weeks.       Hussein Orourke MD                    [facility] Discharge Summary     Patient ID:  Kassidy Garber  [id number]  81 y.o.  1950    Admit date: 10/30/2022    Discharge date:  11/01/22     Admitting Physician: Alondra Foreman DO     Discharge Physician: ELAINA Lockett - CARLIE/Dr. Karo Ny     Admission Diagnoses: Bradycardia [R00.1]    Discharge Diagnoses:   Patient Active Problem List    Diagnosis Date Noted    Chronic low back pain 10/30/2022    Anxiety 10/30/2022    Acute cystitis with hematuria 10/30/2022    Bradycardia 10/30/2022    Hypertension 01/06/2021    Expressive aphasia 01/06/2021    Chronic ischemic heart disease 09/04/2020    Hypercholesteremia 01/31/2019    Angina pectoris ([facility] [address]     Chest pain     Syncope and collapse     Palpitations     Essential hypertension, benign 08/03/2015    CAD in native artery 08/03/2015    Acquired hypothyroidism 08/03/2015    Mixed hyperlipidemia 08/03/2015    Spasm of muscle 08/03/2015       Cardiology Procedures this admission:  Left heart catheterization with PCI  EchoCardiogram  Consults: none    [place] [facility] Course: Patient with PMHx of CAD (s/p 13 stents), HTN, HLD, Hypothyroidism, Anxiety and Chronic back pain who presented to the ED at [facility] with c/o slow HR. Pt states that she has noted he HR to be in the 35-50 range at times on her home oximeter on several occasions. She reports that this AM around 1000 she felt some heaviness in her chest. When she checked her HR it was 36. In the ED: EKGs there report HR 75 and 82. Trop <0.01. Labs normal other than UA + for UTI. She was given 1g IV Rocephin. She was transferred to [facility] for further evaluation. She was admitted to tele floor. Her toprol was placed on hold. Bedside monitoring showed freq PVCs likely reason for bradycardia concerns. An echocardiogram showed 10/30/22    TRANSTHORACIC ECHOCARDIOGRAM (TTE) COMPLETE (CONTRAST/BUBBLE/3D PRN) 10/31/2022  3:54 PM (Final)    Interpretation Summary    Left Ventricle: Normal left ventricular systolic function. Left ventricle size is normal. Normal wall thickness. Mild basilar inferior hypokinesis, otherwise normal LV wall motion. Normal diastolic function. Aortic Valve: Trace regurgitation. Mitral Valve: Mild to moderate regurgitation with an eccentrically directed jet and may underestimate severity. Tricuspid Valve: Mild regurgitation. Unable to assess RVSP due to inadequate or insignificant tricuspid regurgitation. Technical qualifiers: Technically difficult study due to patient's heart rhythm, color flow Doppler was performed and pulse wave and/or continuous wave Doppler was performed. Signed by: Zoya More MD on 10/31/2022  3:54 PM       Patient underwent cardiac catheterization by Dr. Samm Daniels on 10/31/2022. Patient was found to have severe restenosis of the mid to distal RCA status post aggressive postdilatation and angioscope atherectomy.   There are already 2 layers of stent throughout the entire right coronary proper and I would like to avoid a third layer of stenting if at all possible. Long-term dual antiplatelet therapy is warranted. Patient tolerated the procedure well and was taken to the telemetry floor for recovery. She was continued on IV rocephin for UTI during her hospital stay and will need transitioned to po med at d/c. The following morning patient was up feeling well without any complaints of chest pain or shortness of breath. Patient's right radial cath site was clean, dry and intact without hematoma or bruit. Patient's labs were WNL. Patient was seen and examined by Dr. Yoana Goins and determined stable and ready for discharge. Patient was instructed on the importance of medication compliance including taking aspirin and plavix (new Rx given) everyday without missing a dose. After receiving drug eluting stents, the patient will remain on dual anti-platelet therapy for 1 year. For maximized medical therapy for CAD, patient will continue BB (dose decreased d/t bradycardia) and will try pravastatin for now. Been intolerant to all statins in past and insurance will not cover repatha in past. Patient requesting new cardiologist in our office. The patient will have 14 day tele placed today at 2pm in [facility] office then see Dr. Tricia Mcdonald on 11/29/2022 at [address] [place] [address] in [facility]  office. The patient has been referred to cardiac rehab. The patient will be placed on Vantin x 5 days at d/c for UTI. DISPOSITION: The patient is being discharged home in stable condition on a low saturated fat, low cholesterol and low salt diet. The patient is instructed to advance activities as tolerated to the limit of fatigue or shortness of breath. The patient is instructed to avoid all heavy lifting for 5 days. The patient is instructed to watch the cath site for bleeding/oozing; if seen, the patient is instructed to apply firm pressure with a clean cloth and call [facility] at [phone].  The patient is instructed to watch for signs of infection which include: increasing area of redness, fever/hot to touch or purulent drainage at the catheterization site. The patient is instructed not to soak in a bathtub for 7-10 days, but is cleared to shower. The patient is instructed to call the office or return to the ER for immediate evaluation for any shortness of breath or chest pain not relieved by NTG. Discharge Exam: /77   Pulse 77   Temp 97.8 °F (36.6 °C) (Oral)   Resp 17   Ht 5' 7\" (1.702 m)   Wt 196 lb (88.9 kg)   SpO2 92%   BMI 30.70 kg/m²   Patient has been seen by Dr. Morgan Courser: see his progress note for exam details. Physical Exam  Cardiovascular:      Rate and Rhythm: Normal rate. Pulses: Normal pulses. Pulmonary:      Effort: Pulmonary effort is normal.   Neurological:      Mental Status: She is alert.    Right radial site without any bleeding or hematoma      Recent Results (from the past 24 hour(s))   POCT activated clotting time    Collection Time: 10/31/22  2:42 PM   Result Value Ref Range    Activated Clotting Time 729 (H) 70 - 128 SECS   Cardiac procedure    Collection Time: 10/31/22  3:03 PM   Result Value Ref Range    Body Surface Area 2.06 m2   EKG 12 lead    Collection Time: 10/31/22  4:34 PM   Result Value Ref Range    Ventricular Rate 69 BPM    Atrial Rate 69 BPM    P-R Interval 176 ms    QRS Duration 78 ms    Q-T Interval 422 ms    QTc Calculation (Bazett) 452 ms    P Axis 50 degrees    R Axis 8 degrees    T Axis 72 degrees    Diagnosis       Sinus rhythm with frequent Premature ventricular complexes   Magnesium    Collection Time: 11/01/22  3:44 AM   Result Value Ref Range    Magnesium 2.6 (H) 1.8 - 2.4 mg/dL   Basic Metabolic Panel    Collection Time: 11/01/22  3:44 AM   Result Value Ref Range    Sodium 138 133 - 143 mmol/L    Potassium 3.8 3.5 - 5.1 mmol/L    Chloride 108 101 - 110 mmol/L    CO2 24 21 - 32 mmol/L    Anion Gap 6 2 - 11 mmol/L    Glucose 90 65 - 100 mg/dL    BUN 15 8 - 23 MG/DL    Creatinine 0.90 0.6 - 1.0 MG/DL    Est, Leighannm Filt Rate >60 >60 ml/min/1.73m2    Calcium 9.0 8.3 - 10.4 MG/DL   CBC    Collection Time: 11/01/22  3:44 AM   Result Value Ref Range    WBC 8.6 4.3 - 11.1 K/uL    RBC 4.42 4.05 - 5.2 M/uL    Hemoglobin 12.7 11.7 - 15.4 g/dL    Hematocrit 40.2 35.8 - 46.3 %    MCV 91.0 82 - 102 FL    MCH 28.7 26.1 - 32.9 PG    MCHC 31.6 31.4 - 35.0 g/dL    RDW 14.6 11.9 - 14.6 %    Platelets 384 366 - 386 K/uL    MPV 11.2 9.4 - 12.3 FL    nRBC 0.00 0.0 - 0.2 K/uL         Patient Instructions:     Current Discharge Medication List        START taking these medications    Details   aspirin 81 MG chewable tablet Take 1 tablet by mouth daily  Qty: 30 tablet, Refills: 11      cefpodoxime (VANTIN) 100 MG tablet Take 1 tablet by mouth 2 times daily for 5 days  Qty: 10 tablet, Refills: 0      pravastatin (PRAVACHOL) 40 MG tablet Take 1 tablet by mouth daily  Qty: 30 tablet, Refills: 5           CONTINUE these medications which have CHANGED    Details   metoprolol succinate (TOPROL XL) 50 MG extended release tablet Take 1 tablet by mouth daily TAKE ONE TABLET BY MOUTH ONE TIME DAILY  Qty: 30 tablet, Refills: 5      clopidogrel (PLAVIX) 75 MG tablet Take 1 tablet by mouth daily  Qty: 30 tablet, Refills: 11           CONTINUE these medications which have NOT CHANGED    Details   cyclobenzaprine (FLEXERIL) 10 MG tablet TAKE 1 TABLET BY MOUTH THREE TIMES DAILY WITH FOOD      gabapentin (NEURONTIN) 300 MG capsule Take 300 mg by mouth 3 times daily. HYDROcodone-acetaminophen (NORCO)  MG per tablet Take 1 tablet by mouth every 8 hours as needed.       levothyroxine (SYNTHROID) 112 MCG tablet Take 112 mcg by mouth every morning (before breakfast)      nitroGLYCERIN (NITROSTAT) 0.4 MG SL tablet Place 0.4 mg under the tongue      sertraline (ZOLOFT) 50 MG tablet Take 50 mg by mouth daily           STOP taking these medications       ascorbic acid (VITAMIN C) 1000 MG tablet Comments:   Reason for Stopping: not a home med        Cholecalciferol 50 MCG (2000 UT) TABS Comments:   Reason for Stopping: not a home med        Evolocumab (Kirk Parmar) 954 MG/ML SOAJ Comments:   Reason for Stopping: not a home med                  Signed:  ELAINA Torres CNP-C  11/1/2022  9:42 AM

## 2022-11-01 NOTE — PROGRESS NOTES
TR compression device removed after removing 15 mLs of air. No oozing, redness, or drainage noted at the site. Noted bruising on patient's wrist. Transparent occlusive dressing applied.

## 2022-11-01 NOTE — PROGRESS NOTES
Cardiac Rehab: Spoke with patient regarding referral to cardiac rehab. Patient meets admission criteria based on PCI (10/31/22). Written information about Cardiac Rehab given and reviewed with patient. Discussed lifestyle modifications to promote cardiac wellness. Pt with a history of multiple PCIs. She reports she has participated in the Cardiac Rehab program in the past. Patient indicated that she does not want to repeat the cardiac rehab program due to chronic back, hip and leg pain. She was encouraged to tour our program when in for her follow up appt. Her Cardiologist is Dr. Xu Larsen.       Thank you,  LARON HensonN, RN  Cardiopulmonary Rehabilitation Nurse Liaison  [facility]

## 2022-11-01 NOTE — CARE COORDINATION
Discharge order is in. Pt is discharging home in stable condition. No discharge needs identified. Tx goals met. 11/01/22 [address] Discharge   Transition of Care Consult (CM Consult) Discharge [address] Discharge None    Resource Information Provided? No   Mode of Transport at Discharge Other (see comment)  (Family)   Confirm Follow Up Transport Family   Condition of Participation: Discharge Planning   The Patient and/or Patient Representative was provided with a Choice of Provider? Patient   The Patient and/Or Patient Representative agree with the Discharge Plan? Yes   Freedom of Choice list was provided with basic dialogue that supports the patient's individualized plan of care/goals, treatment preferences, and shares the quality data associated with the providers?   Yes

## 2022-11-22 ENCOUNTER — TELEPHONE (OUTPATIENT)
Dept: CARDIOLOGY CLINIC | Age: 72
End: 2022-11-22

## 2022-11-22 NOTE — TELEPHONE ENCOUNTER
Spoke with patient - notified her per Dr. Fabian Rodriguez, I reviewed her Holter monitor results as it came to me and it showed normal rhythm-sinus rhythm throughout with an average heart rate at 63 bpm.  There were frequent PVCs-extra beats from the lower chambers-on an average of 9% of all beats. She has previously seen Dr. Edenilson Cavazos and ideally should follow-up with him rather than me as scheduled. Patient verbalized understanding of information and had no questions. Will keep follow up appointment for hospital discharge on 11/29/2022 and will then follow up with Dr. Edenilson Cavazos.

## 2022-11-22 NOTE — TELEPHONE ENCOUNTER
----- Message from Milan Cantor MD sent at 11/20/2022  7:32 AM EST -----  Please let her know that I reviewed her Holter monitor results as it came to me and it showed normal rhythm-sinus rhythm throughout with an average heart rate at 63 bpm.  There were frequent PVCs-extra beats from the lower chambers-on an average of 9% of all beats. She has previously seen Dr. Fracisco Meng and ideally should follow-up with him rather than me as scheduled.   Thanks,  AD

## 2022-11-29 ENCOUNTER — TELEPHONE (OUTPATIENT)
Dept: CARDIOLOGY CLINIC | Age: 72
End: 2022-11-29

## 2022-11-29 PROBLEM — I49.3 FREQUENT PVCS: Status: ACTIVE | Noted: 2022-11-29

## 2022-11-29 RX ORDER — CLOPIDOGREL BISULFATE 75 MG/1
75 TABLET ORAL DAILY
Qty: 90 TABLET | Refills: 3 | Status: SHIPPED | OUTPATIENT
Start: 2022-11-29

## 2022-11-29 NOTE — TELEPHONE ENCOUNTER
Pt came to wrong office to her appt she rescheduled her appt but she needs refills of her PLAVIX 75MG sent over to [facility] at [address] [place] until she is able to see him on 12/12.  She has 2 left please send over ASAP

## 2022-11-29 NOTE — TELEPHONE ENCOUNTER
Requested Prescriptions     Signed Prescriptions Disp Refills    clopidogrel (PLAVIX) 75 MG tablet 90 tablet 3     Sig: Take 1 tablet by mouth daily     Authorizing Provider: [place] FERNANDEZ     Ordering User: Alec Chance

## 2022-12-12 ENCOUNTER — OFFICE VISIT (OUTPATIENT)
Dept: CARDIOLOGY CLINIC | Age: 72
End: 2022-12-12
Payer: MEDICARE

## 2022-12-12 VITALS
HEIGHT: 67 IN | WEIGHT: 199 LBS | HEART RATE: 60 BPM | DIASTOLIC BLOOD PRESSURE: 80 MMHG | BODY MASS INDEX: 31.23 KG/M2 | SYSTOLIC BLOOD PRESSURE: 110 MMHG

## 2022-12-12 DIAGNOSIS — I25.118 CORONARY ARTERY DISEASE OF NATIVE ARTERY OF NATIVE HEART WITH STABLE ANGINA PECTORIS (HCC): Primary | ICD-10-CM

## 2022-12-12 DIAGNOSIS — E78.49 FAMILIAL HYPERLIPIDEMIA: ICD-10-CM

## 2022-12-12 DIAGNOSIS — I49.3 FREQUENT PVCS: ICD-10-CM

## 2022-12-12 DIAGNOSIS — Z98.61 S/P PTCA (PERCUTANEOUS TRANSLUMINAL CORONARY ANGIOPLASTY): ICD-10-CM

## 2022-12-12 PROCEDURE — 1036F TOBACCO NON-USER: CPT | Performed by: INTERNAL MEDICINE

## 2022-12-12 PROCEDURE — 3017F COLORECTAL CA SCREEN DOC REV: CPT | Performed by: INTERNAL MEDICINE

## 2022-12-12 PROCEDURE — 3074F SYST BP LT 130 MM HG: CPT | Performed by: INTERNAL MEDICINE

## 2022-12-12 PROCEDURE — G8399 PT W/DXA RESULTS DOCUMENT: HCPCS | Performed by: INTERNAL MEDICINE

## 2022-12-12 PROCEDURE — G8484 FLU IMMUNIZE NO ADMIN: HCPCS | Performed by: INTERNAL MEDICINE

## 2022-12-12 PROCEDURE — 3078F DIAST BP <80 MM HG: CPT | Performed by: INTERNAL MEDICINE

## 2022-12-12 PROCEDURE — 99215 OFFICE O/P EST HI 40 MIN: CPT | Performed by: INTERNAL MEDICINE

## 2022-12-12 PROCEDURE — 1123F ACP DISCUSS/DSCN MKR DOCD: CPT | Performed by: INTERNAL MEDICINE

## 2022-12-12 PROCEDURE — G8417 CALC BMI ABV UP PARAM F/U: HCPCS | Performed by: INTERNAL MEDICINE

## 2022-12-12 PROCEDURE — G8427 DOCREV CUR MEDS BY ELIG CLIN: HCPCS | Performed by: INTERNAL MEDICINE

## 2022-12-12 PROCEDURE — 1090F PRES/ABSN URINE INCON ASSESS: CPT | Performed by: INTERNAL MEDICINE

## 2022-12-12 RX ORDER — NITROGLYCERIN 0.4 MG/1
0.4 TABLET SUBLINGUAL EVERY 5 MIN PRN
Qty: 25 TABLET | Refills: 6 | Status: SHIPPED | OUTPATIENT
Start: 2022-12-12

## 2022-12-12 ASSESSMENT — ENCOUNTER SYMPTOMS
HEMATOCHEZIA: 0
BACK PAIN: 1
HOARSE VOICE: 0
HEMOPTYSIS: 0
ABDOMINAL PAIN: 0
WHEEZING: 0
STRIDOR: 0
EYE REDNESS: 0
DOUBLE VISION: 0
HEMATEMESIS: 0

## 2022-12-12 NOTE — PROGRESS NOTES
[facility] CARDIOLOGY  [address] [facility], [address]  PHONE: [phone]          22    NAME:  Janeth Hough  : 1950  MRN: [id number]         SUBJECTIVE:   Janeth Hough is a 81 y.o. female seen for a visit regarding the following:     Chief Complaint   Patient presents with    Follow-Up from Hospital           HPI:    Cardio problem list:  1. Coronary artery disease with multiple previous stents-original MI in .  -Most recent angioplasty to in-stent restenosis within the mid RCA-2022  -Echo from 2022 shows an EF that was normal with some basal inferior wall hypokinesis. 2.  Frequent PVCs-14-day monitor from 2022 shows 9% of all beats were PVCs  3. Hypertension  4. Familial hyperlipidemia-statin intolerant to  atorvastatin, pravastatin and rosuvastatin  5. History of CVA/TIA  Pt of Dr Edenilson Cavazos    I saw Ms. Harper Campbell is a 79-year-old woman with known coronary artery disease, recent angioplasty to in-stent restenosis in mid RCA in 2022, frequent PVCs, hypertension, familial hyperlipidemia statin intolerance, history of previous CVA/TIA. Hospital records from recent admission were reviewed in detail including discharge summary-she initially presented to [place] [facility] [facility] with bradycardia-was found to be in ventricular bigeminy at times. With additional episodes of chest tightness, she underwent coronary angiography by Dr. Jason Wolff which showed significant RCA disease that was appropriately angioplastied as it already had 2 layers of stents. She was then discharged the next day and told to follow-up for a 14-day monitor to the office which she completed and it showed 9% of all beats were PVCs which was the likely cause for her low pulse/bradycardia from ventricular bigeminy at times.     Coronary disease-has a few episodes of chest pain but no particular relieving or aggravating factors-most often with stress and exertion may go away on its own or with sublingual nitroglycerin. Hyperlipidemia-familial-statin intolerant to multiple different statins. Tito Shearer had worked well for her in the past.  We can restart it. Baseline functional capacity is limited-uses a cane to ambulate but limited because of chronic back pain. Past Medical History, Past Surgical History, Family history, Social History, and Medications were all reviewed with the patient today and updated as necessary.      Allergies   Allergen Reactions    Contrast [Iodides] Anaphylaxis     IVP dye     Monosodium Glutamate Shortness Of Breath    Atorvastatin Other (See Comments)    Isosorbide Nitrate Other (See Comments)    Niacin Other (See Comments)    Pravastatin Myalgia    Rosuvastatin Other (See Comments)    Betadine [Povidone Iodine] Rash     Patient Active Problem List   Diagnosis    Chest pain    Essential hypertension, benign    Chronic ischemic heart disease    Angina pectoris ([address]    CAD in native artery    Acquired hypothyroidism    Syncope and collapse    Mixed hyperlipidemia    Hypertension    Spasm of muscle    Palpitations    Expressive aphasia    Hypercholesteremia    Bradycardia    Chronic low back pain    Anxiety    Acute cystitis with hematuria    Frequent PVCs    S/P PTCA (percutaneous transluminal coronary angioplasty)     Past Medical History:   Diagnosis Date    Angina pectoris (HCC)     Backache, unspecified 7/9/2013    Chest pain     Coronary atherosclerosis of native coronary artery 8/3/2015    Degeneration of intervertebral disc, site unspecified 7/9/2013    Essential hypertension, benign 8/3/2015    Family history of other musculoskeletal diseases(V17.89) 7/9/2013    History of MI (myocardial infarction)     Mixed hyperlipidemia 8/3/2015    Other forms of migraine, without mention of intractable migraine without mention of status migrainosus 7/9/2013    Palpitations     Spasm of muscle 8/3/2015    Syncope and collapse     Unspecified hypothyroidism 8/3/2015     Past Surgical History:   Procedure Laterality Date    CARDIAC PROCEDURE N/A 10/31/2022    LEFT HEART CATH / CORONARY ANGIOGRAPHY performed by Nahomi Mo MD at [facility] CATH LAB   [address] [place] [address] N/A 10/31/2022    Percutaneous coronary intervention performed by Nahomi Mo MD at [facility] CATH LAB    HYSTERECTOMY, VAGINAL      [facility] LOC BREAST BIOPSY LEFT Left 5/31/2017    STEVIE STEROTACTIC LOC BREAST BIOPSY LEFT 5/31/2017 [facility]    GA CARDIAC SURG PROCEDURE UNLIST  2004    MI 3 stents    TONSILLECTOMY AND ADENOIDECTOMY       Family History   Problem Relation Age of Onset    Lung Disease Mother     Psychiatric Disorder Son         schizoaffective    Heart Disease Mother      Social History     Tobacco Use    Smoking status: Never    Smokeless tobacco: Never   Substance Use Topics    Alcohol use: No     Alcohol/week: 0.0 standard drinks     Current Outpatient Medications   Medication Sig Dispense Refill    Evolocumab 140 MG/ML SOAJ Inject 140 mg into the skin every 14 days 6 Adjustable Dose Pre-filled Pen Syringe 3    nitroGLYCERIN (NITROSTAT) 0.4 MG SL tablet Place 1 tablet under the tongue every 5 minutes as needed for Chest pain 25 tablet 6    clopidogrel (PLAVIX) 75 MG tablet Take 1 tablet by mouth daily 90 tablet 3    metoprolol succinate (TOPROL XL) 50 MG extended release tablet Take 1 tablet by mouth daily TAKE ONE TABLET BY MOUTH ONE TIME DAILY 30 tablet 5    aspirin 81 MG chewable tablet Take 1 tablet by mouth daily 30 tablet 11    cyclobenzaprine (FLEXERIL) 10 MG tablet TAKE 1 TABLET BY MOUTH THREE TIMES DAILY WITH FOOD   PRN      gabapentin (NEURONTIN) 300 MG capsule Take 300 mg by mouth 3 times daily.  HYDROcodone-acetaminophen (NORCO)  MG per tablet Take 1 tablet by mouth every 8 hours as needed.       levothyroxine (SYNTHROID) 112 MCG tablet Take 100 mcg by mouth every morning (before breakfast)      sertraline (ZOLOFT) 50 MG tablet Take 50 mg by mouth daily       No current facility-administered medications for this visit. Review of Systems   Constitutional: Negative for chills and fever. HENT:  Negative for ear discharge, hoarse voice and stridor. Eyes:  Negative for double vision and redness. Cardiovascular:  Positive for chest pain. Negative for cyanosis and syncope. Respiratory:  Negative for hemoptysis and wheezing. Endocrine: Negative for polydipsia and polyphagia. Hematologic/Lymphatic: Negative for adenopathy. Skin:  Negative for itching and rash. Musculoskeletal:  Positive for back pain. Negative for joint swelling and muscle weakness. Gastrointestinal:  Negative for abdominal pain, hematemesis and hematochezia. Genitourinary:  Negative for flank pain and nocturia. Neurological:  Negative for focal weakness and seizures. Psychiatric/Behavioral:  Negative for altered mental status and suicidal ideas. Allergic/Immunologic: Negative for hives. PHYSICAL EXAM:    /80   Pulse 60   Ht 5' 7\" (1.702 m)   Wt 199 lb (90.3 kg)   BMI 31.17 kg/m²      Physical Exam    General: Alert and oriented in no acute distress  HEENT: Head is normocephalic, atraumatic, pupils are equal bilaterally, throat appears to be clear  Neck: No significant jugular venous distention no cervical bruits  Cardiovascular: S1 and S2 heard, regular rate and rhythm, no significant murmurs rubs or gallops. Respiratory: Clear to auscultation bilaterally with no adventitious sounds, respirations are normal  Abdomen: Soft, nontender, nondistended, bowel sounds present. Extremities: No cyanosis clubbing or edema  Peripheral pulses: Bilateral radial artery pulses are palpated. Bilateral pedal pulses are well felt. Neuro: No facial droop and no gross focal motor deficits  Lymphatic: No significant cervical lymphadenopathy noted.   Musculoskeletal: No significant redness or swelling noted in all exposed joints. Skin: No significant rashes noted the of the exposed regions. Medical problems and test results were reviewed with the patient today. No results found for this or any previous visit (from the past 672 hour(s)). Lab Results   Component Value Date/Time    CHOL 232 10/31/2022 05:56 AM    HDL 47 10/31/2022 05:56 AM   ,hemoglobin, basic metabolic panel, No results found for: TSH, TSH2, TSH3 ,  Lab Results   Component Value Date/Time     11/01/2022 03:44 AM    K 3.8 11/01/2022 03:44 AM     11/01/2022 03:44 AM    CO2 24 11/01/2022 03:44 AM    BUN 15 11/01/2022 03:44 AM      Lab Results   Component Value Date    LDLCALC 161.6 (H) 10/31/2022      Lab Results   Component Value Date    CREATININE 0.90 11/01/2022      10/30/22    TRANSTHORACIC ECHOCARDIOGRAM (TTE) COMPLETE (CONTRAST/BUBBLE/3D PRN) 10/31/2022  3:54 PM (Final)    Interpretation Summary    Left Ventricle: Normal left ventricular systolic function. Left ventricle size is normal. Normal wall thickness. Mild basilar inferior hypokinesis, otherwise normal LV wall motion. Normal diastolic function.   Aortic Valve: Trace regurgitation.   Mitral Valve: Mild to moderate regurgitation with an eccentrically directed jet and may underestimate severity.   Tricuspid Valve: Mild regurgitation. Unable to assess RVSP due to inadequate or insignificant tricuspid regurgitation.   Technical qualifiers: Technically difficult study due to patient's heart rhythm, color flow Doppler was performed and pulse wave and/or continuous wave Doppler was performed.     Signed by: Emmanuel Garcia MD on 10/31/2022  3:54 PM     Lab Results   Component Value Date    HGB 12.7 11/01/2022      Lab Results   Component Value Date     11/01/2022        ASSESSMENT and PLAN      Coronary artery disease of native artery of native heart with stable angina pectoris (HCC)  -     Evolocumab 140 MG/ML SOAJ; Inject 140 mg into the skin every 14 days  -     nitroGLYCERIN (NITROSTAT) 0.4 MG SL tablet; Place 1 tablet under the tongue every 5 minutes as needed for Chest pain    Frequent PVCs  -9% of all beats-continue metoprolol succinate. Fairly well controlled at this point. Reviewed Holter monitor results. Familial hyperlipidemia  -     Evolocumab 140 MG/ML SOAJ; Inject 140 mg into the skin every 14 days  -Statin intolerant. Prescribed Repatha. It had worked well for her and decreased her numbers considerably. S/P PTCA (percutaneous transluminal coronary angioplasty)   -S/p multiple stents to the RCA and in-stent restenosis again s/p angioplasty-continue aspirin Plavix-dual antiplatelet therapy. Overall Impression  -Reviewed hospital records in detail and all previous records. Reviewed coronary angiography with her in detail-personally. Shows distal RCA 80% in-stent restenosis. Still has some episodes of stable angina but nothing as bad as it was prior to going in. Important to be on something to prevent recurrent in-stent restenosis. She is statin intolerant to multiple different statins. Prescribed Repatha 140 mg subcutaneously every 2 weeks. -  -If she does need a back stimulator, she would benefit from this but also some amount of routine cardiovascular exercise to help strengthen the back-consider a pool.  -Staying off Plavix is not an option for her for any procedures with recurrent in-stent restenosis and a metal jacket of stents involving the right coronary artery    Return in about 3 months (around 3/12/2023). Thank you for allowing us to participate in the care of your patient. If you have any further questions, please do not hesitate to contact us.   Sincerely,        Kevin Harrison MD   12/12/2022

## 2023-11-28 RX ORDER — METOPROLOL SUCCINATE 50 MG/1
50 TABLET, EXTENDED RELEASE ORAL DAILY
Qty: 90 TABLET | Refills: 0 | Status: SHIPPED | OUTPATIENT
Start: 2023-11-28

## 2023-11-28 NOTE — TELEPHONE ENCOUNTER
MEDICATION REFILL REQUEST      Name of Medication:  Metoprolol Succinate  Dose:  50 mg  Frequency:  QD  Quantity:  30  Days' supply:  30 with refills      Pharmacy Name/Location:  [phone]

## 2023-11-28 NOTE — TELEPHONE ENCOUNTER
Requested Prescriptions     Pending Prescriptions Disp Refills    metoprolol succinate (TOPROL XL) 50 MG extended release tablet 90 tablet 0     Sig: Take 1 tablet by mouth daily Must be seen to obtain further refills

## 2024-02-15 RX ORDER — CLOPIDOGREL BISULFATE 75 MG/1
75 TABLET ORAL DAILY
Qty: 30 TABLET | Refills: 0 | Status: SHIPPED | OUTPATIENT
Start: 2024-02-15

## 2024-02-15 NOTE — TELEPHONE ENCOUNTER
Patient in need of annual ov.  Called patient to make aware.  Ov scheduled for 2/27/24.       Requested Prescriptions     Signed Prescriptions Disp Refills    clopidogrel (PLAVIX) 75 MG tablet 30 tablet 0     Sig: TAKE ONE TABLET BY MOUTH ONE TIME DAILY     Authorizing Provider: ARIANNA HENRIQUEZ     Ordering User: KARI GAUTHIER      Rx verified.

## 2024-02-27 ENCOUNTER — OFFICE VISIT (OUTPATIENT)
Age: 74
End: 2024-02-27

## 2024-02-27 VITALS
SYSTOLIC BLOOD PRESSURE: 124 MMHG | BODY MASS INDEX: 30.89 KG/M2 | WEIGHT: 196.8 LBS | DIASTOLIC BLOOD PRESSURE: 72 MMHG | HEIGHT: 67 IN | HEART RATE: 76 BPM

## 2024-02-27 DIAGNOSIS — E78.49 FAMILIAL HYPERLIPIDEMIA: ICD-10-CM

## 2024-02-27 DIAGNOSIS — R00.0 TACHYCARDIA: ICD-10-CM

## 2024-02-27 DIAGNOSIS — I25.118 CORONARY ARTERY DISEASE OF NATIVE ARTERY OF NATIVE HEART WITH STABLE ANGINA PECTORIS (HCC): ICD-10-CM

## 2024-02-27 DIAGNOSIS — I49.3 FREQUENT PVCS: Primary | ICD-10-CM

## 2024-02-27 RX ORDER — NITROGLYCERIN 0.4 MG/1
0.4 TABLET SUBLINGUAL EVERY 5 MIN PRN
Qty: 25 TABLET | Refills: 3 | Status: SHIPPED | OUTPATIENT
Start: 2024-02-27

## 2024-02-27 RX ORDER — TIZANIDINE 4 MG/1
TABLET ORAL
COMMUNITY

## 2024-02-27 RX ORDER — CLOPIDOGREL BISULFATE 75 MG/1
75 TABLET ORAL DAILY
Qty: 90 TABLET | Refills: 3 | Status: SHIPPED | OUTPATIENT
Start: 2024-02-27

## 2024-02-27 RX ORDER — METOPROLOL SUCCINATE 50 MG/1
50 TABLET, EXTENDED RELEASE ORAL DAILY
Qty: 90 TABLET | Refills: 3 | Status: SHIPPED | OUTPATIENT
Start: 2024-02-27

## 2024-02-27 ASSESSMENT — ENCOUNTER SYMPTOMS
HOARSE VOICE: 0
WHEEZING: 0
HEMATEMESIS: 0
ABDOMINAL PAIN: 0
DOUBLE VISION: 0
HEMATOCHEZIA: 0
EYE REDNESS: 0
BACK PAIN: 1
HEMOPTYSIS: 0
STRIDOR: 0

## 2024-02-27 NOTE — PROGRESS NOTES
Inclisiran Sodium (LEQVIO) SubCUTAneous 284 mg  284 mg SubCUTAneous Once Juan Hernandez MD           Review of Systems   Constitutional: Negative for chills and fever.   HENT:  Negative for ear discharge, hoarse voice and stridor.    Eyes:  Negative for double vision and redness.   Cardiovascular:  Positive for chest pain. Negative for cyanosis and syncope.   Respiratory:  Negative for hemoptysis and wheezing.    Endocrine: Negative for polydipsia and polyphagia.   Hematologic/Lymphatic: Negative for adenopathy.   Skin:  Negative for itching and rash.   Musculoskeletal:  Positive for back pain. Negative for joint swelling and muscle weakness.   Gastrointestinal:  Negative for abdominal pain, hematemesis and hematochezia.   Genitourinary:  Negative for flank pain and nocturia.   Neurological:  Negative for focal weakness and seizures.   Psychiatric/Behavioral:  Negative for altered mental status and suicidal ideas.    Allergic/Immunologic: Negative for hives.       PHYSICAL EXAM:    /72   Pulse 76   Ht 1.702 m (5' 7\")   Wt 89.3 kg (196 lb 12.8 oz)   BMI 30.82 kg/m²      Physical Exam    General: Alert and oriented in no acute distress, seen in a wheelchair today.  HEENT: Head is normocephalic, atraumatic, pupils are equal bilaterally, throat appears to be clear  Neck: No significant jugular venous distention no cervical bruits  Cardiovascular: S1 and S2 heard, regular rate and rhythm, no significant murmurs rubs or gallops.   Respiratory: Clear to auscultation bilaterally with no adventitious sounds, respirations are normal  Abdomen: Soft, nontender, nondistended, bowel sounds present.  Extremities: No cyanosis clubbing or edema  Peripheral pulses: Bilateral radial artery pulses are palpated.  Bilateral pedal pulses are well felt.  Neuro: No facial droop and no gross focal motor deficits  Lymphatic: No significant cervical lymphadenopathy noted.  Musculoskeletal: No significant redness or swelling

## 2024-09-19 ENCOUNTER — TELEPHONE (OUTPATIENT)
Age: 74
End: 2024-09-19

## 2024-11-29 NOTE — ED TRIAGE NOTES
Pt arrives to the ER reports left shoulder pain that started 2 and a half weeks. Pt states the pain has gotten worse and is radiating into her neck now. Pt has hx of MI and 13 stents, took one nitro about one hour ago which helped a little bit she stated but pain came back. Pt also took a 325 ASA and an ativan as well.   Pt also has a hx oF RA and fibromyalgia [facility] provides services at a reduced cost to those who are determined to be eligible through [facility]’s financial assistance program. Information regarding [facility]’s financial assistance program can be found by going to [email or web address] or by calling [phone].

## 2024-12-04 DIAGNOSIS — I25.118 CORONARY ARTERY DISEASE OF NATIVE ARTERY OF NATIVE HEART WITH STABLE ANGINA PECTORIS (HCC): ICD-10-CM

## 2024-12-04 DIAGNOSIS — R00.0 TACHYCARDIA: ICD-10-CM

## 2024-12-04 RX ORDER — METOPROLOL SUCCINATE 50 MG/1
50 TABLET, EXTENDED RELEASE ORAL DAILY
Qty: 90 TABLET | Refills: 3 | Status: SHIPPED | OUTPATIENT
Start: 2024-12-04

## 2024-12-04 RX ORDER — CLOPIDOGREL BISULFATE 75 MG/1
75 TABLET ORAL DAILY
Qty: 90 TABLET | Refills: 3 | Status: SHIPPED | OUTPATIENT
Start: 2024-12-04

## 2024-12-04 NOTE — TELEPHONE ENCOUNTER
MEDICATION REFILL REQUEST      Name of Medication:  Clopidogrel  Dose:  75 mg  Frequency:  QD  Quantity:  90  Days' supply:  90 with 3 refills      Pharmacy Name/Location:  [phone]    MEDICATION REFILL REQUEST      Name of Medication:  Metoprolol Succinate ER  Dose:  50  Frequency:  QD  Quantity:  90  Days' supply:  90 with 3 refills      Pharmacy Name/Location:  [phone]

## 2024-12-04 NOTE — TELEPHONE ENCOUNTER
Requested Prescriptions     Pending Prescriptions Disp Refills    clopidogrel (PLAVIX) 75 MG tablet 90 tablet 3     Sig: Take 1 tablet by mouth daily    metoprolol succinate (TOPROL XL) 50 MG extended release tablet 90 tablet 3     Sig: Take 1 tablet by mouth daily Must be seen to obtain further refills      Verified rx. Last OV 2/27/24. Erx to pharm on file.

## 2025-08-18 ENCOUNTER — OFFICE VISIT (OUTPATIENT)
Age: 75
End: 2025-08-18
Payer: MEDICARE

## 2025-08-18 VITALS
SYSTOLIC BLOOD PRESSURE: 126 MMHG | HEIGHT: 67 IN | HEART RATE: 83 BPM | WEIGHT: 196.8 LBS | BODY MASS INDEX: 30.89 KG/M2 | DIASTOLIC BLOOD PRESSURE: 78 MMHG

## 2025-08-18 DIAGNOSIS — I49.3 FREQUENT PVCS: ICD-10-CM

## 2025-08-18 DIAGNOSIS — I25.118 CORONARY ARTERY DISEASE OF NATIVE ARTERY OF NATIVE HEART WITH STABLE ANGINA PECTORIS: Primary | ICD-10-CM

## 2025-08-18 DIAGNOSIS — R00.0 TACHYCARDIA: ICD-10-CM

## 2025-08-18 DIAGNOSIS — E78.49 FAMILIAL HYPERLIPIDEMIA: ICD-10-CM

## 2025-08-18 DIAGNOSIS — I49.3 VENTRICULAR PREMATURE DEPOLARIZATION: ICD-10-CM

## 2025-08-18 PROCEDURE — 93000 ELECTROCARDIOGRAM COMPLETE: CPT | Performed by: INTERNAL MEDICINE

## 2025-08-18 PROCEDURE — 1160F RVW MEDS BY RX/DR IN RCRD: CPT | Performed by: INTERNAL MEDICINE

## 2025-08-18 PROCEDURE — 3017F COLORECTAL CA SCREEN DOC REV: CPT | Performed by: INTERNAL MEDICINE

## 2025-08-18 PROCEDURE — 3078F DIAST BP <80 MM HG: CPT | Performed by: INTERNAL MEDICINE

## 2025-08-18 PROCEDURE — 99215 OFFICE O/P EST HI 40 MIN: CPT | Performed by: INTERNAL MEDICINE

## 2025-08-18 PROCEDURE — 1090F PRES/ABSN URINE INCON ASSESS: CPT | Performed by: INTERNAL MEDICINE

## 2025-08-18 PROCEDURE — G8427 DOCREV CUR MEDS BY ELIG CLIN: HCPCS | Performed by: INTERNAL MEDICINE

## 2025-08-18 PROCEDURE — G8399 PT W/DXA RESULTS DOCUMENT: HCPCS | Performed by: INTERNAL MEDICINE

## 2025-08-18 PROCEDURE — G8417 CALC BMI ABV UP PARAM F/U: HCPCS | Performed by: INTERNAL MEDICINE

## 2025-08-18 PROCEDURE — 1123F ACP DISCUSS/DSCN MKR DOCD: CPT | Performed by: INTERNAL MEDICINE

## 2025-08-18 PROCEDURE — 1159F MED LIST DOCD IN RCRD: CPT | Performed by: INTERNAL MEDICINE

## 2025-08-18 PROCEDURE — 3074F SYST BP LT 130 MM HG: CPT | Performed by: INTERNAL MEDICINE

## 2025-08-18 PROCEDURE — 1036F TOBACCO NON-USER: CPT | Performed by: INTERNAL MEDICINE

## 2025-08-18 RX ORDER — NITROGLYCERIN 0.4 MG/1
0.4 TABLET SUBLINGUAL EVERY 5 MIN PRN
Qty: 25 TABLET | Refills: 3 | Status: SHIPPED | OUTPATIENT
Start: 2025-08-18

## 2025-08-18 RX ORDER — SOLIFENACIN SUCCINATE 10 MG/1
TABLET, FILM COATED ORAL
COMMUNITY
Start: 2025-07-06

## 2025-08-18 RX ORDER — EZETIMIBE 10 MG/1
10 TABLET ORAL DAILY
Qty: 30 TABLET | Refills: 11 | Status: SHIPPED | OUTPATIENT
Start: 2025-08-18

## 2025-08-18 RX ORDER — BACLOFEN 10 MG/1
10 TABLET ORAL 3 TIMES DAILY
COMMUNITY

## 2025-08-18 RX ORDER — METOPROLOL SUCCINATE 25 MG/1
25 TABLET, EXTENDED RELEASE ORAL DAILY
Qty: 90 TABLET | Refills: 3 | Status: SHIPPED | OUTPATIENT
Start: 2025-08-18

## 2025-08-18 ASSESSMENT — ENCOUNTER SYMPTOMS
WHEEZING: 0
HEMATEMESIS: 0
STRIDOR: 0
EYE REDNESS: 0
HEMOPTYSIS: 0
HOARSE VOICE: 0
ABDOMINAL PAIN: 0
HEMATOCHEZIA: 0
DOUBLE VISION: 0

## (undated) DEVICE — CATH BLLN ANGIO 3X20MM NC EUPHORIA RX

## (undated) DEVICE — RADIFOCUS OPTITORQUE ANGIOGRAPHIC CATHETER: Brand: OPTITORQUE

## (undated) DEVICE — CATH BLLN SCORING 3X15MM X 137CM PTCA ANGIOSCULPT RX

## (undated) DEVICE — RUNTHROUGH NS EXTRA FLOPPY PTCA GUIDEWIRE: Brand: RUNTHROUGH

## (undated) DEVICE — CATHETER GUID 6FR L100CM GRN PTFE JR4 TRUELUMEN HYBRID

## (undated) DEVICE — GLIDESHEATH SLENDER STAINLESS STEEL KIT: Brand: GLIDESHEATH SLENDER

## (undated) DEVICE — COPILOT BLEEDBACK CONTROL VALVE: Brand: COPILOT

## (undated) DEVICE — DEVICE COMPR REG 24 CM VASC BND

## (undated) DEVICE — CATHETER DIAG AD 5FR L110CM 145DEG COR GRY HYDRPHLC NYL

## (undated) DEVICE — GUIDEWIRE 035IN 210CM PTFE COAT FIX COR J TIP 15MM FIRM BODY